# Patient Record
Sex: FEMALE | Race: WHITE | NOT HISPANIC OR LATINO
[De-identification: names, ages, dates, MRNs, and addresses within clinical notes are randomized per-mention and may not be internally consistent; named-entity substitution may affect disease eponyms.]

---

## 2017-01-23 ENCOUNTER — APPOINTMENT (OUTPATIENT)
Dept: HEART AND VASCULAR | Facility: CLINIC | Age: 67
End: 2017-01-23

## 2017-01-23 VITALS
SYSTOLIC BLOOD PRESSURE: 124 MMHG | HEIGHT: 65 IN | BODY MASS INDEX: 17.83 KG/M2 | HEART RATE: 61 BPM | RESPIRATION RATE: 14 BRPM | OXYGEN SATURATION: 98 % | WEIGHT: 107 LBS | TEMPERATURE: 97.5 F | DIASTOLIC BLOOD PRESSURE: 80 MMHG

## 2017-03-08 ENCOUNTER — APPOINTMENT (OUTPATIENT)
Dept: INTERNAL MEDICINE | Facility: CLINIC | Age: 67
End: 2017-03-08

## 2017-03-08 VITALS
HEART RATE: 64 BPM | SYSTOLIC BLOOD PRESSURE: 100 MMHG | OXYGEN SATURATION: 100 % | RESPIRATION RATE: 12 BRPM | TEMPERATURE: 98.2 F | DIASTOLIC BLOOD PRESSURE: 64 MMHG | WEIGHT: 104 LBS | BODY MASS INDEX: 17.31 KG/M2

## 2017-03-09 LAB
ALBUMIN SERPL ELPH-MCNC: 4.6 G/DL
ALP BLD-CCNC: 49 U/L
ALT SERPL-CCNC: 19 U/L
ANION GAP SERPL CALC-SCNC: 15 MMOL/L
AST SERPL-CCNC: 26 U/L
BILIRUB SERPL-MCNC: 0.4 MG/DL
BUN SERPL-MCNC: 21 MG/DL
CALCIUM SERPL-MCNC: 10.5 MG/DL
CHLORIDE SERPL-SCNC: 99 MMOL/L
CHOLEST SERPL-MCNC: 175 MG/DL
CHOLEST/HDLC SERPL: 1.7 RATIO
CO2 SERPL-SCNC: 27 MMOL/L
CREAT SERPL-MCNC: 0.86 MG/DL
GLUCOSE SERPL-MCNC: 107 MG/DL
HBA1C MFR BLD HPLC: 6.1 %
HDLC SERPL-MCNC: 104 MG/DL
LDLC SERPL CALC-MCNC: 61 MG/DL
POTASSIUM SERPL-SCNC: 6.2 MMOL/L
PROT SERPL-MCNC: 7.3 G/DL
SODIUM SERPL-SCNC: 141 MMOL/L
TRIGL SERPL-MCNC: 49 MG/DL

## 2017-03-10 ENCOUNTER — LABORATORY RESULT (OUTPATIENT)
Age: 67
End: 2017-03-10

## 2017-03-10 ENCOUNTER — APPOINTMENT (OUTPATIENT)
Dept: HEART AND VASCULAR | Facility: CLINIC | Age: 67
End: 2017-03-10

## 2017-03-10 VITALS
RESPIRATION RATE: 12 BRPM | SYSTOLIC BLOOD PRESSURE: 110 MMHG | DIASTOLIC BLOOD PRESSURE: 72 MMHG | BODY MASS INDEX: 17.49 KG/M2 | WEIGHT: 105 LBS | HEART RATE: 65 BPM | HEIGHT: 65 IN | TEMPERATURE: 97.3 F | OXYGEN SATURATION: 99 %

## 2017-03-15 LAB
ALDOSTERONE SERUM: 29.5 NG/DL
MAGNESIUM SERPL-MCNC: 2.3 MG/DL

## 2017-03-23 ENCOUNTER — APPOINTMENT (OUTPATIENT)
Dept: HEART AND VASCULAR | Facility: CLINIC | Age: 67
End: 2017-03-23

## 2017-03-23 VITALS
HEIGHT: 65 IN | HEART RATE: 56 BPM | OXYGEN SATURATION: 98 % | SYSTOLIC BLOOD PRESSURE: 110 MMHG | WEIGHT: 105 LBS | RESPIRATION RATE: 12 BRPM | DIASTOLIC BLOOD PRESSURE: 60 MMHG | BODY MASS INDEX: 17.49 KG/M2

## 2017-06-28 ENCOUNTER — RX RENEWAL (OUTPATIENT)
Age: 67
End: 2017-06-28

## 2017-06-28 ENCOUNTER — LABORATORY RESULT (OUTPATIENT)
Age: 67
End: 2017-06-28

## 2017-06-28 ENCOUNTER — APPOINTMENT (OUTPATIENT)
Dept: ENDOCRINOLOGY | Facility: CLINIC | Age: 67
End: 2017-06-28

## 2017-06-28 VITALS
BODY MASS INDEX: 17.14 KG/M2 | SYSTOLIC BLOOD PRESSURE: 117 MMHG | HEART RATE: 60 BPM | DIASTOLIC BLOOD PRESSURE: 73 MMHG | WEIGHT: 103 LBS

## 2017-07-06 LAB
ALDOST 24H UR-MCNC: NORMAL
ANION GAP SERPL CALC-SCNC: 21 MMOL/L
APPEARANCE: CLEAR
BILIRUBIN URINE: NEGATIVE
BLOOD URINE: ABNORMAL
BUN SERPL-MCNC: 20 MG/DL
CALCIUM SERPL-MCNC: 10.6 MG/DL
CHLORIDE SERPL-SCNC: 99 MMOL/L
CO2 SERPL-SCNC: 25 MMOL/L
COLLAGEN CTX SERPL-MCNC: 119 PG/ML
COLOR: YELLOW
CREAT SERPL-MCNC: 0.88 MG/DL
GLUCOSE QUALITATIVE U: NORMAL MG/DL
GLUCOSE SERPL-MCNC: 85 MG/DL
KETONES URINE: NEGATIVE
LEUKOCYTE ESTERASE URINE: NEGATIVE
NITRITE URINE: NEGATIVE
PH URINE: 6.5
POTASSIUM SERPL-SCNC: 5.4 MMOL/L
PROTEIN URINE: NEGATIVE MG/DL
RENIN PLASMA: 8.4 PG/ML
SODIUM SERPL-SCNC: 145 MMOL/L
SPECIFIC GRAVITY URINE: 1.01
UROBILINOGEN URINE: NORMAL MG/DL

## 2017-09-08 ENCOUNTER — APPOINTMENT (OUTPATIENT)
Dept: INTERNAL MEDICINE | Facility: CLINIC | Age: 67
End: 2017-09-08
Payer: MEDICARE

## 2017-09-08 ENCOUNTER — LABORATORY RESULT (OUTPATIENT)
Age: 67
End: 2017-09-08

## 2017-09-08 VITALS
BODY MASS INDEX: 17.47 KG/M2 | TEMPERATURE: 98.6 F | HEART RATE: 61 BPM | DIASTOLIC BLOOD PRESSURE: 63 MMHG | OXYGEN SATURATION: 96 % | RESPIRATION RATE: 12 BRPM | WEIGHT: 105 LBS | SYSTOLIC BLOOD PRESSURE: 110 MMHG

## 2017-09-08 PROCEDURE — 99213 OFFICE O/P EST LOW 20 MIN: CPT | Mod: 25

## 2017-09-08 PROCEDURE — 36415 COLL VENOUS BLD VENIPUNCTURE: CPT

## 2017-09-11 ENCOUNTER — MESSAGE (OUTPATIENT)
Age: 67
End: 2017-09-11

## 2017-09-11 LAB
ANION GAP SERPL CALC-SCNC: 15 MMOL/L
APPEARANCE: CLEAR
BILIRUBIN URINE: NEGATIVE
BLOOD URINE: ABNORMAL
BUN SERPL-MCNC: 24 MG/DL
CALCIUM SERPL-MCNC: 10.2 MG/DL
CHLORIDE SERPL-SCNC: 102 MMOL/L
CO2 SERPL-SCNC: 28 MMOL/L
COLOR: YELLOW
CREAT SERPL-MCNC: 0.84 MG/DL
GLUCOSE QUALITATIVE U: NORMAL MG/DL
GLUCOSE SERPL-MCNC: 92 MG/DL
KETONES URINE: NEGATIVE
LEUKOCYTE ESTERASE URINE: ABNORMAL
NITRITE URINE: POSITIVE
PH URINE: 5.5
POTASSIUM SERPL-SCNC: 5.1 MMOL/L
POTASSIUM UR-SCNC: 77 MMOL/L
PROTEIN URINE: NEGATIVE MG/DL
SODIUM ?TM SUB UR QN: 35 MMOL/L
SODIUM SERPL-SCNC: 145 MMOL/L
SPECIFIC GRAVITY URINE: 1.02
UROBILINOGEN URINE: NORMAL MG/DL

## 2017-10-04 ENCOUNTER — RX RENEWAL (OUTPATIENT)
Age: 67
End: 2017-10-04

## 2017-12-05 ENCOUNTER — RX RENEWAL (OUTPATIENT)
Age: 67
End: 2017-12-05

## 2018-02-05 ENCOUNTER — APPOINTMENT (OUTPATIENT)
Dept: ENDOCRINOLOGY | Facility: CLINIC | Age: 68
End: 2018-02-05
Payer: MEDICARE

## 2018-02-05 VITALS
HEART RATE: 59 BPM | DIASTOLIC BLOOD PRESSURE: 67 MMHG | SYSTOLIC BLOOD PRESSURE: 106 MMHG | WEIGHT: 104 LBS | BODY MASS INDEX: 17.31 KG/M2

## 2018-02-05 PROCEDURE — 99213 OFFICE O/P EST LOW 20 MIN: CPT

## 2018-02-09 ENCOUNTER — MEDICATION RENEWAL (OUTPATIENT)
Age: 68
End: 2018-02-09

## 2018-03-21 LAB
APPEARANCE: CLEAR
BACTERIA: NEGATIVE
BILIRUBIN URINE: NEGATIVE
BLOOD URINE: NEGATIVE
COLOR: YELLOW
GLUCOSE QUALITATIVE U: NEGATIVE MG/DL
HYALINE CASTS: 0 /LPF
KETONES URINE: NEGATIVE
LEUKOCYTE ESTERASE URINE: NEGATIVE
MICROSCOPIC-UA: NORMAL
NITRITE URINE: NEGATIVE
PH URINE: 5.5
PROTEIN URINE: NEGATIVE MG/DL
RED BLOOD CELLS URINE: 2 /HPF
SPECIFIC GRAVITY URINE: 1.02
SQUAMOUS EPITHELIAL CELLS: 12 /HPF
UROBILINOGEN URINE: NEGATIVE MG/DL
WHITE BLOOD CELLS URINE: 0 /HPF

## 2018-03-23 LAB — BACTERIA UR CULT: ABNORMAL

## 2018-04-10 ENCOUNTER — APPOINTMENT (OUTPATIENT)
Dept: HEART AND VASCULAR | Facility: CLINIC | Age: 68
End: 2018-04-10
Payer: MEDICARE

## 2018-04-10 VITALS
SYSTOLIC BLOOD PRESSURE: 126 MMHG | OXYGEN SATURATION: 94 % | RESPIRATION RATE: 12 BRPM | HEART RATE: 40 BPM | WEIGHT: 106 LBS | HEIGHT: 65 IN | BODY MASS INDEX: 17.66 KG/M2 | TEMPERATURE: 96.6 F | DIASTOLIC BLOOD PRESSURE: 70 MMHG

## 2018-04-10 VITALS — SYSTOLIC BLOOD PRESSURE: 120 MMHG | DIASTOLIC BLOOD PRESSURE: 60 MMHG

## 2018-04-10 PROCEDURE — 93000 ELECTROCARDIOGRAM COMPLETE: CPT

## 2018-04-10 PROCEDURE — 36415 COLL VENOUS BLD VENIPUNCTURE: CPT

## 2018-04-10 PROCEDURE — G0439: CPT

## 2018-04-11 LAB
25(OH)D3 SERPL-MCNC: 53.5 NG/ML
ALBUMIN SERPL ELPH-MCNC: 5.1 G/DL
ALP BLD-CCNC: 53 U/L
ALT SERPL-CCNC: 21 U/L
ANION GAP SERPL CALC-SCNC: 23 MMOL/L
AST SERPL-CCNC: 28 U/L
B BURGDOR AB SER-IMP: NEGATIVE
B BURGDOR IGM PATRN SER IB-IMP: NEGATIVE
B BURGDOR18/20KD IGM SER QL IB: NORMAL
B BURGDOR18KD IGG SER QL IB: PRESENT
B BURGDOR23KD IGG SER QL IB: NORMAL
B BURGDOR23KD IGM SER QL IB: NORMAL
B BURGDOR28KD AB SER QL IB: NORMAL
B BURGDOR28KD IGG SER QL IB: NORMAL
B BURGDOR30KD AB SER QL IB: NORMAL
B BURGDOR30KD IGG SER QL IB: NORMAL
B BURGDOR31KD IGG SER QL IB: NORMAL
B BURGDOR31KD IGM SER QL IB: NORMAL
B BURGDOR39KD IGG SER QL IB: NORMAL
B BURGDOR39KD IGM SER QL IB: NORMAL
B BURGDOR41KD IGG SER QL IB: NORMAL
B BURGDOR41KD IGM SER QL IB: NORMAL
B BURGDOR45KD AB SER QL IB: NORMAL
B BURGDOR45KD IGG SER QL IB: NORMAL
B BURGDOR58KD AB SER QL IB: NORMAL
B BURGDOR58KD IGG SER QL IB: NORMAL
B BURGDOR66KD IGG SER QL IB: PRESENT
B BURGDOR66KD IGM SER QL IB: NORMAL
B BURGDOR93KD IGG SER QL IB: NORMAL
B BURGDOR93KD IGM SER QL IB: NORMAL
BASOPHILS # BLD AUTO: 0.02 K/UL
BASOPHILS NFR BLD AUTO: 0.4 %
BILIRUB SERPL-MCNC: 0.5 MG/DL
BUN SERPL-MCNC: 19 MG/DL
CALCIUM SERPL-MCNC: 10.4 MG/DL
CHLORIDE SERPL-SCNC: 101 MMOL/L
CHOLEST SERPL-MCNC: 202 MG/DL
CHOLEST/HDLC SERPL: 1.8 RATIO
CO2 SERPL-SCNC: 23 MMOL/L
CREAT SERPL-MCNC: 0.9 MG/DL
EOSINOPHIL # BLD AUTO: 0.22 K/UL
EOSINOPHIL NFR BLD AUTO: 4.6 %
FOLATE SERPL-MCNC: >20 NG/ML
GLUCOSE SERPL-MCNC: 87 MG/DL
HBA1C MFR BLD HPLC: 5.8 %
HCT VFR BLD CALC: 45.6 %
HCV AB SER QL: NONREACTIVE
HCV S/CO RATIO: 0.28 S/CO
HDLC SERPL-MCNC: 115 MG/DL
HGB BLD-MCNC: 14 G/DL
IMM GRANULOCYTES NFR BLD AUTO: 0.2 %
LDLC SERPL CALC-MCNC: 74 MG/DL
LYMPHOCYTES # BLD AUTO: 1.14 K/UL
LYMPHOCYTES NFR BLD AUTO: 24 %
MAN DIFF?: NORMAL
MCHC RBC-ENTMCNC: 30.7 GM/DL
MCHC RBC-ENTMCNC: 31.5 PG
MCV RBC AUTO: 102.7 FL
MONOCYTES # BLD AUTO: 0.63 K/UL
MONOCYTES NFR BLD AUTO: 13.3 %
NEUTROPHILS # BLD AUTO: 2.73 K/UL
NEUTROPHILS NFR BLD AUTO: 57.5 %
PLATELET # BLD AUTO: 220 K/UL
POTASSIUM SERPL-SCNC: 5.4 MMOL/L
PROT SERPL-MCNC: 7.8 G/DL
RBC # BLD: 4.44 M/UL
RBC # FLD: 14.3 %
SODIUM SERPL-SCNC: 147 MMOL/L
TRIGL SERPL-MCNC: 66 MG/DL
TSH SERPL-ACNC: 2.67 UIU/ML
VIT B12 SERPL-MCNC: 1251 PG/ML
WBC # FLD AUTO: 4.75 K/UL

## 2018-06-01 LAB
ALBUMIN SERPL ELPH-MCNC: 4.6 G/DL
ALP BLD-CCNC: 51 U/L
ALT SERPL-CCNC: 22 U/L
ANION GAP SERPL CALC-SCNC: 12 MMOL/L
AST SERPL-CCNC: 26 U/L
BILIRUB SERPL-MCNC: 0.4 MG/DL
BUN SERPL-MCNC: 21 MG/DL
CALCIUM SERPL-MCNC: 9.4 MG/DL
CHLORIDE SERPL-SCNC: 102 MMOL/L
CHOLEST SERPL-MCNC: 178 MG/DL
CHOLEST/HDLC SERPL: 1.8 RATIO
CO2 SERPL-SCNC: 29 MMOL/L
CREAT SERPL-MCNC: 0.88 MG/DL
GLUCOSE SERPL-MCNC: 94 MG/DL
HDLC SERPL-MCNC: 99 MG/DL
LDLC SERPL CALC-MCNC: 67 MG/DL
POTASSIUM SERPL-SCNC: 6.1 MMOL/L
PROT SERPL-MCNC: 7.2 G/DL
SODIUM SERPL-SCNC: 143 MMOL/L
TRIGL SERPL-MCNC: 59 MG/DL

## 2018-06-13 ENCOUNTER — APPOINTMENT (OUTPATIENT)
Dept: HEART AND VASCULAR | Facility: CLINIC | Age: 68
End: 2018-06-13
Payer: MEDICARE

## 2018-06-13 DIAGNOSIS — I65.23 OCCLUSION AND STENOSIS OF BILATERAL CAROTID ARTERIES: ICD-10-CM

## 2018-06-13 PROCEDURE — 93306 TTE W/DOPPLER COMPLETE: CPT

## 2018-06-13 PROCEDURE — 99213 OFFICE O/P EST LOW 20 MIN: CPT | Mod: 25

## 2018-06-13 PROCEDURE — 93880 EXTRACRANIAL BILAT STUDY: CPT | Mod: XE

## 2018-06-18 ENCOUNTER — APPOINTMENT (OUTPATIENT)
Dept: INTERNAL MEDICINE | Facility: CLINIC | Age: 68
End: 2018-06-18
Payer: MEDICARE

## 2018-06-18 VITALS
TEMPERATURE: 97.7 F | SYSTOLIC BLOOD PRESSURE: 122 MMHG | BODY MASS INDEX: 17.33 KG/M2 | WEIGHT: 104 LBS | RESPIRATION RATE: 14 BRPM | HEART RATE: 74 BPM | HEIGHT: 65 IN | OXYGEN SATURATION: 96 % | DIASTOLIC BLOOD PRESSURE: 74 MMHG

## 2018-06-18 PROCEDURE — 36415 COLL VENOUS BLD VENIPUNCTURE: CPT

## 2018-06-18 PROCEDURE — 99213 OFFICE O/P EST LOW 20 MIN: CPT | Mod: 25

## 2018-06-19 LAB
ANION GAP SERPL CALC-SCNC: 15 MMOL/L
BUN SERPL-MCNC: 20 MG/DL
CALCIUM SERPL-MCNC: 10.3 MG/DL
CHLORIDE SERPL-SCNC: 101 MMOL/L
CO2 SERPL-SCNC: 28 MMOL/L
CREAT SERPL-MCNC: 0.85 MG/DL
GLUCOSE SERPL-MCNC: 103 MG/DL
POTASSIUM SERPL-SCNC: 5.4 MMOL/L
SODIUM SERPL-SCNC: 144 MMOL/L

## 2018-07-12 ENCOUNTER — RX RENEWAL (OUTPATIENT)
Age: 68
End: 2018-07-12

## 2018-07-16 ENCOUNTER — MESSAGE (OUTPATIENT)
Age: 68
End: 2018-07-16

## 2018-07-16 LAB
CREAT 24H UR-MCNC: 0.7 G/24 H
CREAT 24H UR-MCNC: 0.7 G/24 H
CREAT ?TM UR-MCNC: 25 MG/DL
CREAT ?TM UR-MCNC: 25 MG/DL
POTASSIUM 24H UR-MRATE: 75 MMOL/24HR
POTASSIUM ?TM SUB UR QN: 26 MMOL/L
PROT ?TM UR-MCNC: 24 HR
PROT ?TM UR-MCNC: 24 HR
SPECIMEN VOL 24H UR: 2900 ML
SPECIMEN VOL 24H UR: 2900 ML

## 2018-07-25 ENCOUNTER — MESSAGE (OUTPATIENT)
Age: 68
End: 2018-07-25

## 2018-07-25 LAB
ALDOSTERONE SERUM: 11.3 NG/DL
ANION GAP SERPL CALC-SCNC: 13 MMOL/L
BUN SERPL-MCNC: 20 MG/DL
CALCIUM SERPL-MCNC: 9.8 MG/DL
CHLORIDE SERPL-SCNC: 100 MMOL/L
CO2 SERPL-SCNC: 29 MMOL/L
CREAT SERPL-MCNC: 0.9 MG/DL
GLUCOSE SERPL-MCNC: 85 MG/DL
POTASSIUM SERPL-SCNC: 5.3 MMOL/L
RENIN PLASMA: 13.8 PG/ML
SODIUM SERPL-SCNC: 142 MMOL/L

## 2018-08-08 ENCOUNTER — INPATIENT (INPATIENT)
Facility: HOSPITAL | Age: 68
LOS: 1 days | Discharge: HOME | DRG: 157 | End: 2018-08-10
Attending: NEUROLOGICAL SURGERY | Admitting: NEUROLOGICAL SURGERY
Payer: MEDICARE

## 2018-08-08 VITALS
HEART RATE: 78 BPM | TEMPERATURE: 98 F | OXYGEN SATURATION: 97 % | DIASTOLIC BLOOD PRESSURE: 75 MMHG | SYSTOLIC BLOOD PRESSURE: 136 MMHG | WEIGHT: 103.62 LBS | RESPIRATION RATE: 18 BRPM

## 2018-08-08 LAB
ANION GAP SERPL CALC-SCNC: 13 MMOL/L — SIGNIFICANT CHANGE UP (ref 5–17)
APTT BLD: 32.5 SEC — SIGNIFICANT CHANGE UP (ref 27.5–37.4)
BASOPHILS NFR BLD AUTO: 0.1 % — SIGNIFICANT CHANGE UP (ref 0–2)
BLD GP AB SCN SERPL QL: NEGATIVE — SIGNIFICANT CHANGE UP
BUN SERPL-MCNC: 21 MG/DL — SIGNIFICANT CHANGE UP (ref 7–23)
CALCIUM SERPL-MCNC: 9.9 MG/DL — SIGNIFICANT CHANGE UP (ref 8.4–10.5)
CHLORIDE SERPL-SCNC: 99 MMOL/L — SIGNIFICANT CHANGE UP (ref 96–108)
CO2 SERPL-SCNC: 30 MMOL/L — SIGNIFICANT CHANGE UP (ref 22–31)
CREAT SERPL-MCNC: 0.74 MG/DL — SIGNIFICANT CHANGE UP (ref 0.5–1.3)
EOSINOPHIL NFR BLD AUTO: 0.3 % — SIGNIFICANT CHANGE UP (ref 0–6)
GLUCOSE SERPL-MCNC: 110 MG/DL — HIGH (ref 70–99)
HCT VFR BLD CALC: 40.4 % — SIGNIFICANT CHANGE UP (ref 34.5–45)
HGB BLD-MCNC: 13.1 G/DL — SIGNIFICANT CHANGE UP (ref 11.5–15.5)
INR BLD: 1.03 — SIGNIFICANT CHANGE UP (ref 0.88–1.16)
LYMPHOCYTES # BLD AUTO: 8.3 % — LOW (ref 13–44)
MCHC RBC-ENTMCNC: 30.8 PG — SIGNIFICANT CHANGE UP (ref 27–34)
MCHC RBC-ENTMCNC: 32.4 G/DL — SIGNIFICANT CHANGE UP (ref 32–36)
MCV RBC AUTO: 94.8 FL — SIGNIFICANT CHANGE UP (ref 80–100)
MONOCYTES NFR BLD AUTO: 7.4 % — SIGNIFICANT CHANGE UP (ref 2–14)
NEUTROPHILS NFR BLD AUTO: 83.9 % — HIGH (ref 43–77)
PLATELET # BLD AUTO: 198 K/UL — SIGNIFICANT CHANGE UP (ref 150–400)
POTASSIUM SERPL-MCNC: 4 MMOL/L — SIGNIFICANT CHANGE UP (ref 3.5–5.3)
POTASSIUM SERPL-SCNC: 4 MMOL/L — SIGNIFICANT CHANGE UP (ref 3.5–5.3)
PROTHROM AB SERPL-ACNC: 11.4 SEC — SIGNIFICANT CHANGE UP (ref 9.8–12.7)
RBC # BLD: 4.26 M/UL — SIGNIFICANT CHANGE UP (ref 3.8–5.2)
RBC # FLD: 13.5 % — SIGNIFICANT CHANGE UP (ref 10.3–16.9)
RH IG SCN BLD-IMP: POSITIVE — SIGNIFICANT CHANGE UP
SODIUM SERPL-SCNC: 142 MMOL/L — SIGNIFICANT CHANGE UP (ref 135–145)
WBC # BLD: 9.6 K/UL — SIGNIFICANT CHANGE UP (ref 3.8–10.5)
WBC # FLD AUTO: 9.6 K/UL — SIGNIFICANT CHANGE UP (ref 3.8–10.5)

## 2018-08-08 PROCEDURE — 99285 EMERGENCY DEPT VISIT HI MDM: CPT

## 2018-08-08 PROCEDURE — 70450 CT HEAD/BRAIN W/O DYE: CPT | Mod: 26

## 2018-08-08 PROCEDURE — 70486 CT MAXILLOFACIAL W/O DYE: CPT | Mod: 26

## 2018-08-08 RX ORDER — LEVETIRACETAM 250 MG/1
500 TABLET, FILM COATED ORAL
Qty: 0 | Refills: 0 | Status: DISCONTINUED | OUTPATIENT
Start: 2018-08-09 | End: 2018-08-10

## 2018-08-08 RX ORDER — PENICILLIN V POTASSIUM 250 MG
500 TABLET ORAL ONCE
Qty: 0 | Refills: 0 | Status: COMPLETED | OUTPATIENT
Start: 2018-08-08 | End: 2018-08-08

## 2018-08-08 RX ORDER — LEVETIRACETAM 250 MG/1
500 TABLET, FILM COATED ORAL ONCE
Qty: 0 | Refills: 0 | Status: COMPLETED | OUTPATIENT
Start: 2018-08-08 | End: 2018-08-08

## 2018-08-08 RX ORDER — SIMVASTATIN 20 MG/1
40 TABLET, FILM COATED ORAL AT BEDTIME
Qty: 0 | Refills: 0 | Status: DISCONTINUED | OUTPATIENT
Start: 2018-08-08 | End: 2018-08-10

## 2018-08-08 RX ORDER — ACETAMINOPHEN 500 MG
650 TABLET ORAL EVERY 6 HOURS
Qty: 0 | Refills: 0 | Status: DISCONTINUED | OUTPATIENT
Start: 2018-08-08 | End: 2018-08-10

## 2018-08-08 RX ORDER — DESMOPRESSIN ACETATE 0.1 MG/1
14 TABLET ORAL ONCE
Qty: 0 | Refills: 0 | Status: COMPLETED | OUTPATIENT
Start: 2018-08-08 | End: 2018-08-08

## 2018-08-08 RX ORDER — TETANUS TOXOID, REDUCED DIPHTHERIA TOXOID AND ACELLULAR PERTUSSIS VACCINE, ADSORBED 5; 2.5; 8; 8; 2.5 [IU]/.5ML; [IU]/.5ML; UG/.5ML; UG/.5ML; UG/.5ML
0.5 SUSPENSION INTRAMUSCULAR ONCE
Qty: 0 | Refills: 0 | Status: COMPLETED | OUTPATIENT
Start: 2018-08-08 | End: 2018-08-08

## 2018-08-08 RX ORDER — CEPHALEXIN 500 MG
500 CAPSULE ORAL EVERY 12 HOURS
Qty: 0 | Refills: 0 | Status: DISCONTINUED | OUTPATIENT
Start: 2018-08-08 | End: 2018-08-10

## 2018-08-08 RX ORDER — CEPHALEXIN 500 MG
500 CAPSULE ORAL
Qty: 0 | Refills: 0 | Status: DISCONTINUED | OUTPATIENT
Start: 2018-08-08 | End: 2018-08-08

## 2018-08-08 RX ORDER — CEFAZOLIN SODIUM 1 G
1000 VIAL (EA) INJECTION ONCE
Qty: 0 | Refills: 0 | Status: COMPLETED | OUTPATIENT
Start: 2018-08-08 | End: 2018-08-08

## 2018-08-08 RX ORDER — DESMOPRESSIN ACETATE 0.1 MG/1
14 TABLET ORAL ONCE
Qty: 0 | Refills: 0 | Status: DISCONTINUED | OUTPATIENT
Start: 2018-08-08 | End: 2018-08-08

## 2018-08-08 RX ORDER — SENNA PLUS 8.6 MG/1
2 TABLET ORAL AT BEDTIME
Qty: 0 | Refills: 0 | Status: DISCONTINUED | OUTPATIENT
Start: 2018-08-08 | End: 2018-08-10

## 2018-08-08 RX ORDER — DOCUSATE SODIUM 100 MG
100 CAPSULE ORAL THREE TIMES A DAY
Qty: 0 | Refills: 0 | Status: DISCONTINUED | OUTPATIENT
Start: 2018-08-08 | End: 2018-08-10

## 2018-08-08 RX ORDER — ACETAMINOPHEN 500 MG
975 TABLET ORAL ONCE
Qty: 0 | Refills: 0 | Status: COMPLETED | OUTPATIENT
Start: 2018-08-08 | End: 2018-08-08

## 2018-08-08 RX ADMIN — Medication 975 MILLIGRAM(S): at 15:56

## 2018-08-08 RX ADMIN — LEVETIRACETAM 500 MILLIGRAM(S): 250 TABLET, FILM COATED ORAL at 17:57

## 2018-08-08 RX ADMIN — Medication 100 MILLIGRAM(S): at 18:07

## 2018-08-08 RX ADMIN — Medication 500 MILLIGRAM(S): at 14:47

## 2018-08-08 RX ADMIN — TETANUS TOXOID, REDUCED DIPHTHERIA TOXOID AND ACELLULAR PERTUSSIS VACCINE, ADSORBED 0.5 MILLILITER(S): 5; 2.5; 8; 8; 2.5 SUSPENSION INTRAMUSCULAR at 14:47

## 2018-08-08 RX ADMIN — DESMOPRESSIN ACETATE 214 MICROGRAM(S): 0.1 TABLET ORAL at 19:10

## 2018-08-08 RX ADMIN — Medication 100 MILLIGRAM(S): at 21:43

## 2018-08-08 NOTE — ED ADULT NURSE NOTE - NSIMPLEMENTINTERV_GEN_ALL_ED
Implemented All Fall Risk Interventions:  [place] to call system. Call bell, personal items and telephone within reach. Instruct patient to call for assistance. Room bathroom lighting operational. Non-slip footwear when patient is off stretcher. Physically safe environment: no spills, clutter or unnecessary equipment. Stretcher in lowest position, wheels locked, appropriate side rails in place. Provide visual cue, wrist band, yellow gown, etc. Monitor gait and stability. Monitor for mental status changes and reorient to person, place, and time. Review medications for side effects contributing to fall risk. Reinforce activity limits and safety measures with patient and family.

## 2018-08-08 NOTE — H&P ADULT - NSHPLABSRESULTS_GEN_ALL_CORE
CTH/maxillofacial 8/8/18:    IMPRESSION:    Multifocal comminuted fracture of the right maxillary sinus, nondisplaced   fractures of the right orbital floor and roof with mild inferior   extraconal hemorrhage and gas but no proptosis or retrobulbar hemorrhage.   Orbital floor fracture traverses the infraorbital foramen; correlate for   midface numbness. No imaging findings of extraocular muscle entrapment,   though clinical correlation is suggested.    Intracranially, there is acute interhemispheric subdural hematoma.

## 2018-08-08 NOTE — H&P ADULT - HISTORY OF PRESENT ILLNESS
66y/o F with PMHx sig for HLD, on baby aspirin, BIBEMS to ER after sustaining a mechanical fall along the pavement of a sidewalk. Pt reports she was walking and suddenly tripped landing on her face (mainly right side), with associated LOC, and dizziness after. CTH reveals an acute interhemispheric subdural hematoma. CT maxillofacial reveals "multifocal comminuted fracture of the right maxillary sinus, nondisplaced fractures of the right orbital floor and roof with mild inferior extraconal hemorrhage and gas." Pt also has a right eyebrow laceration, which was sutured by plastics. She reports "droplets of blood" from her nose. Pt currently denies headache, N/V, neck pain, acute changes in vision, acute weakness/loss of sensation of extremities, fever, chills, CP, SOB, urinary/bowel incontinence.

## 2018-08-08 NOTE — H&P ADULT - NSHPPHYSICALEXAM_GEN_ALL_CORE
PHYSICAL EXAM:  Constitutional: NAD, resting comfortably  Eyes: R periorbital edema with surrounding ecchymosis  ENMT: No active rhinorrhea or discharge from nose, chipped teeth  Neck: Supple, nontender  Back: Nontender to palpation  Respiratory: CTA B/L  Cardiovascular: RRR, +S1/S2  Gastrointestinal: Abdomen soft, NT/ND  Neurological: AAox3, FC, speech coherent  CNII-XII: EOM intact, PERRL, face symmetric, tongue midline  Motor: MAEx4 5/5 UE and LE B/L, negative protonator drift, negative dysmetria  SILT throughout

## 2018-08-08 NOTE — ED PROVIDER NOTE - OBJECTIVE STATEMENT
66 yo female h/o eczema, hyperkalemia, carotid arthersclerosis, mitral regurg, osteoporosis, hsv, polio c/o mechanical fall on uneven pavement w chi, loc, mild frontal ha. Pt w lac R eyebrow, inner R lip and chipped tooth on dental bridge as well as bilat knee abrasion.  Pt w mild confusion after fall, now at baseline.  No neck/back/chest/abd pain, n/v, pain in ext.  Ambulatory after fall.  Unsure of last td.  Requests plastics repair.

## 2018-08-08 NOTE — ED PROVIDER NOTE - PROGRESS NOTE DETAILS
Plastics consulted and will eval. Verbal report of ct head/face - + maxillary sinus fx, orbital floor and roof fx, small sdh.  NSG consulted and will eval. Lacs repaired by Dr Houston who will see pt in fu in clinic 8/14, keflex x 7d.  Per nsg, pt tba to stepdown under Dr Rodriguez and request keppra 500 mg po.

## 2018-08-08 NOTE — H&P ADULT - ATTENDING COMMENTS
Patient seen and examined by me. She had a mechanical fall resulting in a thin anterior interhemispheric subdural hematoma. There is no mass effect. Patient is neurologically normal. Plan for repeat CT scan, keppra x 1 week, OMFS consultation for facial/oral/dental fractures, syncope workup per medicine. No neurosurgical intervention needed. Expect home dispo once medical/PT cleared.    Frank Rodriguez M.D.  Neurosurgery

## 2018-08-08 NOTE — H&P ADULT - PMH
Carotid atherosclerosis, bilateral    Eczema    HSV (herpes simplex virus) infection    Hyperlipidemia    Mitral regurgitation    Osteoporosis    Polio

## 2018-08-08 NOTE — ED ADULT TRIAGE NOTE - OTHER COMPLAINTS
patient does not remember how she feel, does not remember if she lost consciousness. Patient denies blood thinner use.

## 2018-08-08 NOTE — ED PROVIDER NOTE - MEDICAL DECISION MAKING DETAILS
Pt w mechanical fall w chi, eyebrow and inner lip lac, dental injury.  Plan ct head/face, update td, plastics for wound repair, abx for dental fx (may be pt's bridge rather than tooth - to fu w dental as outpt); pt declined pain meds.

## 2018-08-08 NOTE — ED PROVIDER NOTE - CARE PLAN
Principal Discharge DX:	SDH (subdural hematoma)  Secondary Diagnosis:	Facial fracture due to fall  Secondary Diagnosis:	Facial laceration, initial encounter

## 2018-08-08 NOTE — ED ADULT NURSE NOTE - EENT WDL
Eyes with no visual disturbances.  Ears clean and dry and no hearing difficulties. Nose with pink mucosa and no drainage, scant amount of dried blood noted to right nare.  Mouth mucous membranes moist and pink.  No tenderness or swelling to throat or neck.

## 2018-08-08 NOTE — ED PROVIDER NOTE - ENMT, MLM
Airway patent, Nasal mucosa clear. Mouth ecchymosis and mild swelling R upper lip, small stellate lac inner lip, chipped R incisor, periorbital ecchymosis and abrasion, 1 cm R eyebrow lac, throat has no vesicles, no oropharyngeal exudates and uvula is midline. tm bilat cerumen impaction, R upper incisor w small chip, mild ttp maxilla over R teeth, no other facial bone ttp

## 2018-08-08 NOTE — H&P ADULT - ASSESSMENT
66y/o F with multiple comminuted facial fractures and an acute interhemispheric SDH    PLAN:  -Admit to stepdown  -Continue keppra 500mg BID x 1 week  -Aspirin reversal with DDAVP  -Obtain repeat CTH at 11pm  -Per plastic surgery, continue keflex 500mg QID PO x1 week and f/u with Dr. Ender Houston on Tuesday 8/14/18  -ENT consulted, however nothing to do as plastic surgery is involved  -DVT prophylaxis: SCDs  -D/w Dr. Rodriguez

## 2018-08-08 NOTE — ED PROVIDER NOTE - MUSCULOSKELETAL, MLM
Spine appears normal, neck/back nontender, abrasion w/o ttp/swelling/deformity bilat knee, range of motion is not limited, no muscle or joint tenderness

## 2018-08-08 NOTE — ED ADULT NURSE NOTE - PMH
Carotid atherosclerosis, bilateral    Eczema    HSV (herpes simplex virus) infection    Mitral regurgitation    Osteoporosis    Polio

## 2018-08-09 ENCOUNTER — TRANSCRIPTION ENCOUNTER (OUTPATIENT)
Age: 68
End: 2018-08-09

## 2018-08-09 LAB
ANION GAP SERPL CALC-SCNC: 9 MMOL/L — SIGNIFICANT CHANGE UP (ref 5–17)
APTT BLD: 28.6 SEC — SIGNIFICANT CHANGE UP (ref 27.5–37.4)
BLD GP AB SCN SERPL QL: NEGATIVE — SIGNIFICANT CHANGE UP
BUN SERPL-MCNC: 16 MG/DL — SIGNIFICANT CHANGE UP (ref 7–23)
CALCIUM SERPL-MCNC: 9.3 MG/DL — SIGNIFICANT CHANGE UP (ref 8.4–10.5)
CHLORIDE SERPL-SCNC: 104 MMOL/L — SIGNIFICANT CHANGE UP (ref 96–108)
CO2 SERPL-SCNC: 29 MMOL/L — SIGNIFICANT CHANGE UP (ref 22–31)
CREAT SERPL-MCNC: 0.67 MG/DL — SIGNIFICANT CHANGE UP (ref 0.5–1.3)
GLUCOSE SERPL-MCNC: 82 MG/DL — SIGNIFICANT CHANGE UP (ref 70–99)
HCT VFR BLD CALC: 36.3 % — SIGNIFICANT CHANGE UP (ref 34.5–45)
HGB BLD-MCNC: 11.9 G/DL — SIGNIFICANT CHANGE UP (ref 11.5–15.5)
INR BLD: 1.02 — SIGNIFICANT CHANGE UP (ref 0.88–1.16)
MAGNESIUM SERPL-MCNC: 2.2 MG/DL — SIGNIFICANT CHANGE UP (ref 1.6–2.6)
MCHC RBC-ENTMCNC: 31.5 PG — SIGNIFICANT CHANGE UP (ref 27–34)
MCHC RBC-ENTMCNC: 32.8 G/DL — SIGNIFICANT CHANGE UP (ref 32–36)
MCV RBC AUTO: 96 FL — SIGNIFICANT CHANGE UP (ref 80–100)
PHOSPHATE SERPL-MCNC: 3.7 MG/DL — SIGNIFICANT CHANGE UP (ref 2.5–4.5)
PLATELET # BLD AUTO: 187 K/UL — SIGNIFICANT CHANGE UP (ref 150–400)
POTASSIUM SERPL-MCNC: 3.9 MMOL/L — SIGNIFICANT CHANGE UP (ref 3.5–5.3)
POTASSIUM SERPL-SCNC: 3.9 MMOL/L — SIGNIFICANT CHANGE UP (ref 3.5–5.3)
PROTHROM AB SERPL-ACNC: 11.3 SEC — SIGNIFICANT CHANGE UP (ref 9.8–12.7)
RBC # BLD: 3.78 M/UL — LOW (ref 3.8–5.2)
RBC # FLD: 13.7 % — SIGNIFICANT CHANGE UP (ref 10.3–16.9)
RH IG SCN BLD-IMP: POSITIVE — SIGNIFICANT CHANGE UP
SODIUM SERPL-SCNC: 142 MMOL/L — SIGNIFICANT CHANGE UP (ref 135–145)
WBC # BLD: 5.9 K/UL — SIGNIFICANT CHANGE UP (ref 3.8–10.5)
WBC # FLD AUTO: 5.9 K/UL — SIGNIFICANT CHANGE UP (ref 3.8–10.5)

## 2018-08-09 PROCEDURE — 93306 TTE W/DOPPLER COMPLETE: CPT | Mod: 26

## 2018-08-09 PROCEDURE — 99231 SBSQ HOSP IP/OBS SF/LOW 25: CPT | Mod: 24

## 2018-08-09 PROCEDURE — 93880 EXTRACRANIAL BILAT STUDY: CPT | Mod: 26

## 2018-08-09 PROCEDURE — 99223 1ST HOSP IP/OBS HIGH 75: CPT

## 2018-08-09 PROCEDURE — 70450 CT HEAD/BRAIN W/O DYE: CPT | Mod: 26

## 2018-08-09 PROCEDURE — 93010 ELECTROCARDIOGRAM REPORT: CPT

## 2018-08-09 RX ORDER — POTASSIUM CHLORIDE 20 MEQ
20 PACKET (EA) ORAL ONCE
Qty: 0 | Refills: 0 | Status: COMPLETED | OUTPATIENT
Start: 2018-08-09 | End: 2018-08-09

## 2018-08-09 RX ADMIN — LEVETIRACETAM 500 MILLIGRAM(S): 250 TABLET, FILM COATED ORAL at 17:33

## 2018-08-09 RX ADMIN — Medication 100 MILLIGRAM(S): at 22:08

## 2018-08-09 RX ADMIN — Medication 100 MILLIGRAM(S): at 05:12

## 2018-08-09 RX ADMIN — Medication 500 MILLIGRAM(S): at 06:43

## 2018-08-09 RX ADMIN — Medication 20 MILLIEQUIVALENT(S): at 11:55

## 2018-08-09 RX ADMIN — Medication 500 MILLIGRAM(S): at 17:33

## 2018-08-09 RX ADMIN — LEVETIRACETAM 500 MILLIGRAM(S): 250 TABLET, FILM COATED ORAL at 05:12

## 2018-08-09 RX ADMIN — SIMVASTATIN 40 MILLIGRAM(S): 20 TABLET, FILM COATED ORAL at 10:01

## 2018-08-09 RX ADMIN — Medication 100 MILLIGRAM(S): at 13:48

## 2018-08-09 NOTE — DISCHARGE NOTE ADULT - CARE PLAN
Principal Discharge DX:	SDH (subdural hematoma)  Goal:	Return to prehospital level of function  Assessment and plan of treatment:	66 y/o female with SDH after mechanical fall, repeat CT head stable  - Take keppra as prescribed x1 week  - Follow up with Dr. Rodriguez outpatient, call the office to make an appointment: [phone]  - Follow up with Dr. Houston (plastic surgery) outpatient, call the office to make an appointment: [phone]  - Follow up with your primary care doctor outpatient  - Call the office or return to ED if develop worsening headache or change in mental status

## 2018-08-09 NOTE — DISCHARGE NOTE ADULT - CARE PROVIDER_API CALL
Frank Rodriguez), Neurosurgery  [address] [place] [address]  Phone: [phone]  Fax: [phone]    Ender Houston), Plastic Surgery; Surgery; Surgery of the Hand  [address]  [address]  [place] [address]  Phone: [phone]  Fax: [phone]

## 2018-08-09 NOTE — PROGRESS NOTE ADULT - SUBJECTIVE AND OBJECTIVE BOX
HPI:  68y/o F with PMHx sig for HLD, on baby aspirin, BIBEMS to ER after sustaining a mechanical fall along the pavement of a sidewalk. Pt reports she was walking and suddenly tripped landing on her face (mainly right side), with associated LOC, and dizziness after. CTH reveals an acute interhemispheric subdural hematoma. CT maxillofacial reveals "multifocal comminuted fracture of the right maxillary sinus, nondisplaced fractures of the right orbital floor and roof with mild inferior extraconal hemorrhage and gas." Pt also has a right eyebrow laceration, which was sutured by plastics. She reports "droplets of blood" from her nose. Pt currently denies headache, N/V, neck pain, acute changes in vision, acute weakness/loss of sensation of extremities, fever, chills, CP, SOB, urinary/bowel incontinence. (08 Aug 2018 17:31)      Hospital course:   8/8: Admitted for SDH s/p mechanical fall   8/9:     OVERNIGHT EVENTS:   Denies acute changes to vision, weakness or loss of sensation.     Vital Signs Last 24 Hrs  T(C): 36.1 (09 Aug 2018 05:29), Max: 37.1 (08 Aug 2018 16:23)  T(F): 97 (09 Aug 2018 05:29), Max: 98.7 (08 Aug 2018 16:23)  HR: 58 (09 Aug 2018 04:22) (56 - 84)  BP: 110/60 (09 Aug 2018 04:22) (110/60 - 137/70)  BP(mean): 79 (09 Aug 2018 04:22) (79 - 88)  RR: 18 (09 Aug 2018 04:22) (17 - 18)  SpO2: 98% (09 Aug 2018 04:22) (95% - 99%)    I&O's Summary    08 Aug 2018 07:01  -  09 Aug 2018 06:32  --------------------------------------------------------  IN: 0 mL / OUT: 201 mL / NET: -201 mL        PHYSICAL EXAM:  	Constitutional: NAD, resting comfortably  	Eyes: R periorbital edema with surrounding ecchymosis  	ENMT: No active rhinorrhea or discharge from nose, chipped teeth  	Neck: Supple, nontender  	Back: Nontender to palpation  	Respiratory: CTA B/L  	Cardiovascular: RRR, +S1/S2  	Gastrointestinal: Abdomen soft, NT/ND  	Neurological: AAox3, FC, speech coherent  	CNII-XII: EOM intact, PERRL, face symmetric, tongue midline  	Motor: MAEx4 5/5 UE and LE B/L, negative protonator drift, negative dysmetria  SILT throughout    TUBES/LINES:  [] Sher  [] Lumbar Drain  [] Wound Drains  [] Others       DIET:  [] NPO  [x] Mechanical  [] Tube feeds    LABS:                        13.1   9.6   )-----------( 198      ( 08 Aug 2018 17:55 )             40.4     08-08    142  |  99  |  21  ----------------------------<  110<H>  4.0   |  30  |  0.74    Ca    9.9      08 Aug 2018 17:55      PT/INR - ( 08 Aug 2018 17:55 )   PT: 11.4 sec;   INR: 1.03          PTT - ( 08 Aug 2018 17:55 )  PTT:32.5 sec        CAPILLARY BLOOD GLUCOSE      POCT Blood Glucose.: 148 mg/dL (08 Aug 2018 13:33)      Drug Levels: [] N/A    CSF Analysis: [] N/A      Allergies    sulfa drugs (Stomach Upset)    Intolerances        Home Medications:  aspirin 81 mg oral tablet: 1 tab(s) orally once a day (08 Aug 2018 14:37)  simvastatin:  (08 Aug 2018 14:37)      MEDICATIONS:  Antibiotics:  cephalexin 500 milliGRAM(s) Oral every 12 hours    Neuro:  acetaminophen   Tablet 650 milliGRAM(s) Oral every 6 hours PRN  acetaminophen   Tablet. 650 milliGRAM(s) Oral every 6 hours PRN  levETIRAcetam 500 milliGRAM(s) Oral two times a day    Anticoagulation:    OTHER:  docusate sodium 100 milliGRAM(s) Oral three times a day  senna 2 Tablet(s) Oral at bedtime PRN  simvastatin 40 milliGRAM(s) Oral at bedtime    IVF:    CULTURES:    RADIOLOGY & ADDITIONAL TESTS:      ASSESSMENT:  68y/o F with multiple comminuted facial fractures and an acute interhemispheric SDH    SDH; FACIAL LACERATION  Handoff  MEWS Score  Hyperlipidemia  Polio  Mitral regurgitation  HSV (herpes simplex virus) infection  Osteoporosis  Eczema  Carotid atherosclerosis, bilateral  SDH (subdural hematoma)  No significant past surgical history  FALL  Dental injury, initial encounter  Facial laceration, initial encounter  Facial fracture due to fall      PLAN:  NEURO:  - Q2 neuro checks   - S/p DDAVP for ASA use   - Cont. Keppra 500mg BID x1 week   - Elev HOB 30 deg   - F/u PRS recs re: facial fx - continue keflex 500mg QID PO x1 week and f/u with Dr. Ender Houston on Tuesday 8/14/18    CARDIOVASCULAR:  - BP goal: normotension     PULMONARY:  - IS     RENAL:  - I&Os     GI:  - Bowel regimen     HEME:  - Trend H/H     ID:  - Monitor for fevers     ENDO:  - ISS     DVT PROPHYLAXIS:  [x] Venodynes only                               [] Heparin/Lovenox    DISPOSITION:  - SDU status   - Full code   - Dispo: pending PT  - D/w Dr. Rodriguez HPI:  66y/o F with PMHx sig for HLD, on baby aspirin, BIBEMS to ER after sustaining a mechanical fall along the pavement of a sidewalk. Pt reports she was walking and suddenly tripped landing on her face (mainly right side), with associated LOC, and dizziness after. CTH reveals an acute interhemispheric subdural hematoma. CT maxillofacial reveals "multifocal comminuted fracture of the right maxillary sinus, nondisplaced fractures of the right orbital floor and roof with mild inferior extraconal hemorrhage and gas." Pt also has a right eyebrow laceration, which was sutured by plastics. She reports "droplets of blood" from her nose. Pt currently denies headache, N/V, neck pain, acute changes in vision, acute weakness/loss of sensation of extremities, fever, chills, CP, SOB, urinary/bowel incontinence. (08 Aug 2018 17:31)      Hospital course:   8/8: Admitted for SDH s/p mechanical fall   8/9: Repeat CTH shows resolution of subdural. Echo and carotid dopplers ordered for syncope w/u. OMFS consult for dental fractures.     OVERNIGHT EVENTS:   Denies acute changes to vision, weakness or loss of sensation.     Vital Signs Last 24 Hrs  T(C): 36.1 (09 Aug 2018 05:29), Max: 37.1 (08 Aug 2018 16:23)  T(F): 97 (09 Aug 2018 05:29), Max: 98.7 (08 Aug 2018 16:23)  HR: 58 (09 Aug 2018 04:22) (56 - 84)  BP: 110/60 (09 Aug 2018 04:22) (110/60 - 137/70)  BP(mean): 79 (09 Aug 2018 04:22) (79 - 88)  RR: 18 (09 Aug 2018 04:22) (17 - 18)  SpO2: 98% (09 Aug 2018 04:22) (95% - 99%)    I&O's Summary    08 Aug 2018 07:01  -  09 Aug 2018 06:32  --------------------------------------------------------  IN: 0 mL / OUT: 201 mL / NET: -201 mL        PHYSICAL EXAM:  	Constitutional: NAD, resting comfortably  	Eyes: R periorbital edema with surrounding ecchymosis  	ENMT: No active rhinorrhea or discharge from nose, chipped teeth  	Neck: Supple, nontender  	Back: Nontender to palpation  	Respiratory: CTA B/L  	Cardiovascular: RRR, +S1/S2  	Gastrointestinal: Abdomen soft, NT/ND  	Neurological: AAox3, FC, speech coherent  	CNII-XII: EOM intact, PERRL, face symmetric, tongue midline  	Motor: MAEx4 5/5 UE and LE B/L, negative pronator drift, negative dysmetria  SILT throughout    TUBES/LINES:  [] Sher  [] Lumbar Drain  [] Wound Drains  [] Others       DIET:  [] NPO  [x] Mechanical  [] Tube feeds    LABS:                        13.1   9.6   )-----------( 198      ( 08 Aug 2018 17:55 )             40.4     08-08    142  |  99  |  21  ----------------------------<  110<H>  4.0   |  30  |  0.74    Ca    9.9      08 Aug 2018 17:55      PT/INR - ( 08 Aug 2018 17:55 )   PT: 11.4 sec;   INR: 1.03          PTT - ( 08 Aug 2018 17:55 )  PTT:32.5 sec        CAPILLARY BLOOD GLUCOSE      POCT Blood Glucose.: 148 mg/dL (08 Aug 2018 13:33)      Drug Levels: [] N/A    CSF Analysis: [] N/A      Allergies    sulfa drugs (Stomach Upset)    Intolerances        Home Medications:  aspirin 81 mg oral tablet: 1 tab(s) orally once a day (08 Aug 2018 14:37)  simvastatin:  (08 Aug 2018 14:37)      MEDICATIONS:  Antibiotics:  cephalexin 500 milliGRAM(s) Oral every 12 hours    Neuro:  acetaminophen   Tablet 650 milliGRAM(s) Oral every 6 hours PRN  acetaminophen   Tablet. 650 milliGRAM(s) Oral every 6 hours PRN  levETIRAcetam 500 milliGRAM(s) Oral two times a day    Anticoagulation:    OTHER:  docusate sodium 100 milliGRAM(s) Oral three times a day  senna 2 Tablet(s) Oral at bedtime PRN  simvastatin 40 milliGRAM(s) Oral at bedtime    IVF:    CULTURES:    RADIOLOGY & ADDITIONAL TESTS:      ASSESSMENT:  66y/o F with multiple comminuted facial fractures and an acute interhemispheric SDH    SDH; FACIAL LACERATION  Handoff  MEWS Score  Hyperlipidemia  Polio  Mitral regurgitation  HSV (herpes simplex virus) infection  Osteoporosis  Eczema  Carotid atherosclerosis, bilateral  SDH (subdural hematoma)  No significant past surgical history  FALL  Dental injury, initial encounter  Facial laceration, initial encounter  Facial fracture due to fall      PLAN:  NEURO:  - Q2 neuro checks   - S/p DDAVP for ASA use   - Cont. Keppra 500mg BID x1 week   - Elev HOB 30 deg   - F/u PRS recs re: facial fx - continue keflex 500mg QID PO x1 week and f/u with Dr. Ender Houston on Tuesday 8/14/18  - F/u OMFS consult  - Dental f/u o/p    CARDIOVASCULAR:  - BP goal: normotension   - F/u sycnope w/u: echo and carotid dopplers   - F/u Dr. John still     PULMONARY:  - IS     RENAL:  - I&Os     GI:  - Bowel regimen     HEME:  - Trend H/H     ID:  - Monitor for fevers     ENDO:  - ISS     DVT PROPHYLAXIS:  [x] Venodynes only                               [] Heparin/Lovenox    DISPOSITION:  - SDU status   - Full code   - Dispo: pending PT  - D/w Dr. Rodriguez

## 2018-08-09 NOTE — DISCHARGE NOTE ADULT - NS AS ACTIVITY OBS
Walking-Indoors allowed/No Heavy lifting/straining/Showering allowed/Walking-Outdoors allowed/Bathing allowed

## 2018-08-09 NOTE — DISCHARGE NOTE ADULT - CONDITION (STATED IN TERMS THAT PERMIT A SPECIFIC MEASURABLE COMPARISON WITH CONDITION ON ADMISSION):
Vital signs are stable, afebrile. No neurological deficit. Has headache. Cleared for discharge by PT and Dr. Rodriguez

## 2018-08-09 NOTE — PHYSICAL THERAPY INITIAL EVALUATION ADULT - MODALITIES TREATMENT COMMENTS
smile symmetrical, tongue midline, eyes open/close and raise eyebrows intact. H-test and quadrant test intact

## 2018-08-09 NOTE — PHYSICAL THERAPY INITIAL EVALUATION ADULT - ADDITIONAL COMMENTS
Pt lives at home alone, elevator access, denies MARYANA. PTA: indep with functional mobility, no AD. right hand dominant, and eyeglasses for correctives.

## 2018-08-09 NOTE — PHYSICAL THERAPY INITIAL EVALUATION ADULT - GENERAL OBSERVATIONS, REHAB EVAL
Rec'd pt seated in bed side chair, non-acute distress, +call bell, +EKG, cleared for PT and OOB activity by Katelynn KAHN and Tiff love.

## 2018-08-09 NOTE — DISCHARGE NOTE ADULT - CARE PROVIDERS DIRECT ADDRESSES
,[email or web address]

## 2018-08-09 NOTE — PHYSICAL THERAPY INITIAL EVALUATION ADULT - PERTINENT HX OF CURRENT PROBLEM, REHAB EVAL
66 yo F BIBEMS to ER after sustaining a mechanical fall along the pavement of a sidewalk. Pt reports she was walking and suddenly tripped landing on her face (mainly right side), with associated LOC, and dizziness after. CTH reveals an acute interhemispheric subdural hematoma. CT maxillofacial reveals "multifocal comminuted fracture of the right maxillary sinus, nondisplaced fractures of the right orbital floor and roof with mild inferior extraconal hemorrhage and gas."

## 2018-08-09 NOTE — PROGRESS NOTE ADULT - SUBJECTIVE AND OBJECTIVE BOX
=================================  NEUROCRITICAL CARE ATTENDING NOTE  =================================    DYANA YANG   [id number]  Summary:  67y/F  with dyslipidemia on ASA, presented with mechanical fall, landing on R face, (+) LOC, dizziness.  Brought to ED where CT showed acute interhemispheric SDH, multifocal comminuted fracture R max sinus, nondisplaced Fx R orbital floor, roof with mild inferior extracoronal hemorrhage and gas.  Admitted to 8.  Overnight Events: No significant events overnight.  REVIEW OF SYSTEMS:  No headaches, no nausea or vomiting; 14 -point review of systems otherwise unremarkable.    Past Medical History: Hyperlipidemia Polio Mitral regurgitation HSV (herpes simplex virus) infection Osteoporosis EczemaCarotid atherosclerosis, bilateral  Allergies:  sulfa drugs (Stomach Upset)  Home meds: ASA 81mg daily simvastatin    PHYSICAL EXAMINATION  T(C): 36.6 ( @ 14:30), Max: 37.1 ( @ 16:23) HR: 56 ( @ 08:36) (56 - 84) BP: 116/56 ( @ 08:36) (110/60 - 137/70) RR: 17 ( @ 08:36) (17 - 18) SpO2: 98% ( @ 08:36) (95% - 99%)  NEUROLOGIC EXAMINATION:  Patient is awake, alert, fully oriented, follows commands, face symmetric, tongue midline, pupils 3mm equal and briskly reactive to light, EOMs intact, muscle strength 5/5 on all 4 extremities  GENERAL:  not intubated, not in cardiorespiratory distress  EENT: anicteric, R periorbital edema, ecchymosis  CARDIOVASC:  (+) S1 S2, bradycardic rate and regular rhythm  PULMONARY:  clear to auscultation bilaterally  ABDOMEN:  soft, nontender, with normoactive bowel sounds  EXTREMITIES:  no edema  SKIN:  no rash    LABS:             11.9   5.9   )-----------( 187      ( 09 Aug 2018 06:14 )             36.3     142  |  104  |  16  ----------------------------<  82  3.9   |  29  |  0.67    Ca    9.3      09 Aug 2018 06:14  Phos  3.7     -  Mg     2.2      @ 07:01  -   @ 07:00  IN: 150 mL / OUT: 201 mL / NET: -51 mL    Bacteriology:  CSF studies:  EEG:  Neuroimagin/09 CT:  improvement of acute SDH   CT maxillofacial:  multifocal comminuted Fx R max sinus, nondisplaced Fx R orbital floor and roof with mild inferior extraconal hemorrhage and gas, orbital floor fracture   CT:  acute interhemispheric SDH  Other imaging:    MEDICATIONS: cephalexin 500mg PO q12h docusate levetiracetam 500 PO BID simvastatin 40mg HS senna 2mg HS    IV FLUIDS: IVL  DRIPS:  DIET: regular diet  Lines:  Drains:    Wounds:    CODE STATUS:  Full Code                       GOALS OF CARE:  aggressive                      DISPOSITION:  8[place]

## 2018-08-09 NOTE — DISCHARGE NOTE ADULT - PLAN OF CARE
Return to prehospital level of function 66 y/o female with SDH after mechanical fall, repeat CT head stable  - Take keppra as prescribed x1 week  - Follow up with Dr. Rodriguez outpatient, call the office to make an appointment: [phone]  - Follow up with Dr. Houston (plastic surgery) outpatient, call the office to make an appointment: [phone]  - Follow up with your primary care doctor outpatient  - Call the office or return to ED if develop worsening headache or change in mental status

## 2018-08-09 NOTE — CONSULT NOTE ADULT - SUBJECTIVE AND OBJECTIVE BOX
Patient is a 67y old  Female who presents with a chief complaint of SDH s/p mechanical fall (08 Aug 2018 17:31)        HPI:  68y/o F with PMHx sig for HLD, on baby aspirin, BIBEMS to ER after sustaining a mechanical fall along the pavement of a sidewalk. Pt reports she was walking and suddenly tripped landing on her face (mainly right side), with associated LOC, and dizziness after. CTH reveals an acute interhemispheric subdural hematoma. CT maxillofacial reveals "multifocal comminuted fracture of the right maxillary sinus, nondisplaced fractures of the right orbital floor and roof with mild inferior extraconal hemorrhage and gas." Pt also has a right eyebrow laceration, which was sutured by plastics. She reports "droplets of blood" from her nose. Pt currently denies headache, N/V, neck pain, acute changes in vision, acute weakness/loss of sensation of extremities, fever, chills, CP, SOB, urinary/bowel incontinence. (08 Aug 2018 17:31)     Falling episodes - no new weakness - mild headaches - facial and head trauma     Awake and alert- no aphasia      Allergies  sulfa drugs (Stomach Upset)      Health Issues  SDH; FACIAL LACERATION  Handoff  MEWS Score  Hyperlipidemia  Polio  Mitral regurgitation  HSV (herpes simplex virus) infection  Osteoporosis  Eczema  Carotid atherosclerosis, bilateral  SDH (subdural hematoma)  No significant past surgical history  FALL  Dental injury, initial encounter  Facial laceration, initial encounter  Facial fracture due to fall        FAMILY HISTORY:      MEDICATIONS  (STANDING):  cephalexin 500 milliGRAM(s) Oral every 12 hours  docusate sodium 100 milliGRAM(s) Oral three times a day  levETIRAcetam 500 milliGRAM(s) Oral two times a day  simvastatin 40 milliGRAM(s) Oral at bedtime    MEDICATIONS  (PRN):  acetaminophen   Tablet 650 milliGRAM(s) Oral every 6 hours PRN For Temp greater than 38 C (100.4 F)  acetaminophen   Tablet. 650 milliGRAM(s) Oral every 6 hours PRN Mild Pain (1 - 3)  senna 2 Tablet(s) Oral at bedtime PRN Constipation      PAST MEDICAL & SURGICAL HISTORY:  Hyperlipidemia  Polio  Mitral regurgitation  HSV (herpes simplex virus) infection  Osteoporosis  Eczema  Carotid atherosclerosis, bilateral  No significant past surgical history      Labs                          11.9   5.9   )-----------( 187      ( 09 Aug 2018 06:14 )             36.3     08-09    142  |  104  |  16  ----------------------------<  82  3.9   |  29  |  0.67    Ca    9.3      09 Aug 2018 06:14  Phos  3.7     08-09  Mg     2.2     08-09        Radiology:    Physical Exam    MENTAL STATUS  -Level of Consciousness- awake    Orientation- person, place time  Language- aphasia/ dysarthria- nl  Memory- recent and remote- nl      Cranial Nerve 1- 12  Pupils- equal and reactive  Eye movements- full  Facial - no asymmetry   Lower CN-nl    Gait and Station- no foot drop    MOTOR  Upper-nl  Lower-nl    Reflexes- intact    Sensation- no sensory level    Cerebellar- no tremors    vascular - no bruits    Assessment- Subdural    Plan as per Dr Rodriguez

## 2018-08-09 NOTE — PROGRESS NOTE ADULT - ASSESSMENT
67y/F with  1.  acute interhemispheric SDH  2.  multifocal comminuted fractureR maxillary sinus, nondisplaced R orbital floor and roof fracture,   3.  dyslipidemia, bilateral carotid atherosclerosis  4.  mitral regurgitation  5. Osteoporosis  6.  h/o polio HSV infection    PLAN:   NEURO: neurochecks q4h, PRN pain meds with Tylenol  SDH:  no surgical plans, continue seizure prophylaxis: levetiracetam 500mg BID x 7 days  REHAB:  physical therapy evaluation and management    EARLY MOB:  HOB up    PULM:  Room air, incentive spirometry  CARDIO:  SBP goal 100-150mm Hg; syncope w/u c/o internal medicine; carotid dopplers  ENDO:  Blood sugar goals 140-180 mg/dL, continue simvastatin  GI:  PPI for GI prophylaxis  DIET: regular diet  RENAL:  IVL  HEM/ONC: given DDAVP for ASA use  VTE Prophylaxis: SCDs, no DVT chemoprophylaxis for now as patient is high risk for bleed (fresh bleed)  ID: afebrile, no leukocytosis; cephalexin as per PRS  Social: will update family  MISC:  OMFS consult for maxillofacial fractures    ATTENDING ATTESTATION:  I was physically present for the key portions of the evaluation and management (E/M) service provided.  I agree with the above history, physical and plan which I have reviewed and edited where appropriate.     Patient not at high risk for neurologic deterioration / death.  Time spent on this noncritically ill patient: 45 minutes spent on total encounter, more than 50% of the visit was spent counseling and/or coordinating care by the attending physician.    Plan discussed with RN, house staff.

## 2018-08-09 NOTE — DISCHARGE NOTE ADULT - MEDICATION SUMMARY - MEDICATIONS TO TAKE
I will START or STAY ON the medications listed below when I get home from the hospital:    acetaminophen 325 mg oral tablet  -- 2 tab(s) by mouth every 6 hours, As needed, For Temp greater than 38 C (100.4 F)  -- Indication: For fever    acetaminophen 325 mg oral tablet  -- 2 tab(s) by mouth every 6 hours, As needed, Mild Pain (1 - 3)  -- Indication: For Pain    Keppra 500 mg oral tablet  -- 1 tab(s) by mouth 2 times a day MDD:2  -- Indication: For Seizure prevention    simvastatin  -- Indication: For HLD    Keflex 500 mg oral capsule  -- 1 cap(s) by mouth every 12 hours MDD:2  -- Indication: For infection prevention    docusate sodium 100 mg oral capsule  -- 1 cap(s) by mouth 3 times a day  -- Indication: For bowel regimen

## 2018-08-09 NOTE — DISCHARGE NOTE ADULT - PATIENT PORTAL LINK FT
You can access the [facility] Patient Portal, offered by [facility], by registering with the following website: [email or web address]

## 2018-08-09 NOTE — DISCHARGE NOTE ADULT - NS AS DC AMI YN
Operative Note





- Note:


Operative Date: 02/07/18


Pre-Operative Diagnosis: Retained POC.  partial molar pregnancy


Operation: Suction, D&C.  Real-time US guidance


Findings: 





Normal uterus, RPOC noted


Post-Operative Diagnosis: Same as Pre-op


Surgeon: Papito Harris


Anesthesiologist/CRNA: Yecenia Roque


Anesthesia: General


Specimens Removed: POC


Estimated Blood Loss (mls): 10


Blood Volume Replaced (mls): 0


Fluid Volume Replaced (mls): 300


Operative Report Dictated: Yes no

## 2018-08-09 NOTE — DISCHARGE NOTE ADULT - ADDITIONAL INSTRUCTIONS
- Take keppra as prescribed x1 week  - Follow up with Dr. Rodriguez outpatient, call the office to make an appointment: [phone]  - Follow up with Dr. Houston (plastic surgery) outpatient, call the office to make an appointment: [phone]  - Follow up with your primary care doctor outpatient  - Call the office or return to ED if develop worsening headache or change in mental status

## 2018-08-09 NOTE — DISCHARGE NOTE ADULT - HOSPITAL COURSE
HPI:  68y/o F with PMHx sig for HLD, on baby aspirin, BIBEMS to ER after sustaining a mechanical fall along the pavement of a sidewalk. Pt reports she was walking and suddenly tripped landing on her face (mainly right side), with associated LOC, and dizziness after. CTH reveals an acute interhemispheric subdural hematoma. CT maxillofacial reveals "multifocal comminuted fracture of the right maxillary sinus, nondisplaced fractures of the right orbital floor and roof with mild inferior extraconal hemorrhage and gas." Pt also has a right eyebrow laceration, which was sutured by plastics. She reports "droplets of blood" from her nose. Pt currently denies headache, N/V, neck pain, acute changes in vision, acute weakness/loss of sensation of extremities, fever, chills, CP, SOB, urinary/bowel incontinence.    Hospital course:   8/8: Admitted for SDH s/p mechanical fall   8/9: Repeat CTH shows resolution of subdural. Echo and carotid dopplers ordered for syncope w/u. OMFS consult for dental fractures.

## 2018-08-10 VITALS — TEMPERATURE: 98 F

## 2018-08-10 LAB
ANION GAP SERPL CALC-SCNC: 8 MMOL/L — SIGNIFICANT CHANGE UP (ref 5–17)
BUN SERPL-MCNC: 20 MG/DL — SIGNIFICANT CHANGE UP (ref 7–23)
CALCIUM SERPL-MCNC: 9.2 MG/DL — SIGNIFICANT CHANGE UP (ref 8.4–10.5)
CHLORIDE SERPL-SCNC: 94 MMOL/L — LOW (ref 96–108)
CO2 SERPL-SCNC: 30 MMOL/L — SIGNIFICANT CHANGE UP (ref 22–31)
CREAT SERPL-MCNC: 0.7 MG/DL — SIGNIFICANT CHANGE UP (ref 0.5–1.3)
GLUCOSE SERPL-MCNC: 104 MG/DL — HIGH (ref 70–99)
HCT VFR BLD CALC: 36.8 % — SIGNIFICANT CHANGE UP (ref 34.5–45)
HGB BLD-MCNC: 11.7 G/DL — SIGNIFICANT CHANGE UP (ref 11.5–15.5)
MAGNESIUM SERPL-MCNC: 2 MG/DL — SIGNIFICANT CHANGE UP (ref 1.6–2.6)
MCHC RBC-ENTMCNC: 30.8 PG — SIGNIFICANT CHANGE UP (ref 27–34)
MCHC RBC-ENTMCNC: 31.8 G/DL — LOW (ref 32–36)
MCV RBC AUTO: 96.8 FL — SIGNIFICANT CHANGE UP (ref 80–100)
PHOSPHATE SERPL-MCNC: 3.3 MG/DL — SIGNIFICANT CHANGE UP (ref 2.5–4.5)
PLATELET # BLD AUTO: 180 K/UL — SIGNIFICANT CHANGE UP (ref 150–400)
POTASSIUM SERPL-MCNC: 5.6 MMOL/L — HIGH (ref 3.5–5.3)
POTASSIUM SERPL-SCNC: 5.6 MMOL/L — HIGH (ref 3.5–5.3)
RBC # BLD: 3.8 M/UL — SIGNIFICANT CHANGE UP (ref 3.8–5.2)
RBC # FLD: 13.3 % — SIGNIFICANT CHANGE UP (ref 10.3–16.9)
SODIUM SERPL-SCNC: 132 MMOL/L — LOW (ref 135–145)
WBC # BLD: 5.4 K/UL — SIGNIFICANT CHANGE UP (ref 3.8–10.5)
WBC # FLD AUTO: 5.4 K/UL — SIGNIFICANT CHANGE UP (ref 3.8–10.5)

## 2018-08-10 PROCEDURE — 83735 ASSAY OF MAGNESIUM: CPT

## 2018-08-10 PROCEDURE — 12051 INTMD RPR FACE/MM 2.5 CM/<: CPT

## 2018-08-10 PROCEDURE — 82962 GLUCOSE BLOOD TEST: CPT

## 2018-08-10 PROCEDURE — 93306 TTE W/DOPPLER COMPLETE: CPT

## 2018-08-10 PROCEDURE — 70486 CT MAXILLOFACIAL W/O DYE: CPT

## 2018-08-10 PROCEDURE — 93005 ELECTROCARDIOGRAM TRACING: CPT

## 2018-08-10 PROCEDURE — 85730 THROMBOPLASTIN TIME PARTIAL: CPT

## 2018-08-10 PROCEDURE — 90471 IMMUNIZATION ADMIN: CPT

## 2018-08-10 PROCEDURE — 86900 BLOOD TYPING SEROLOGIC ABO: CPT

## 2018-08-10 PROCEDURE — 99285 EMERGENCY DEPT VISIT HI MDM: CPT | Mod: 25

## 2018-08-10 PROCEDURE — 80048 BASIC METABOLIC PNL TOTAL CA: CPT

## 2018-08-10 PROCEDURE — 36415 COLL VENOUS BLD VENIPUNCTURE: CPT

## 2018-08-10 PROCEDURE — 90715 TDAP VACCINE 7 YRS/> IM: CPT

## 2018-08-10 PROCEDURE — 99231 SBSQ HOSP IP/OBS SF/LOW 25: CPT | Mod: 24

## 2018-08-10 PROCEDURE — 85025 COMPLETE CBC W/AUTO DIFF WBC: CPT

## 2018-08-10 PROCEDURE — 70450 CT HEAD/BRAIN W/O DYE: CPT

## 2018-08-10 PROCEDURE — 85610 PROTHROMBIN TIME: CPT

## 2018-08-10 PROCEDURE — 85027 COMPLETE CBC AUTOMATED: CPT

## 2018-08-10 PROCEDURE — 86850 RBC ANTIBODY SCREEN: CPT

## 2018-08-10 PROCEDURE — 97161 PT EVAL LOW COMPLEX 20 MIN: CPT

## 2018-08-10 PROCEDURE — 93880 EXTRACRANIAL BILAT STUDY: CPT

## 2018-08-10 PROCEDURE — 86901 BLOOD TYPING SEROLOGIC RH(D): CPT

## 2018-08-10 PROCEDURE — 84100 ASSAY OF PHOSPHORUS: CPT

## 2018-08-10 RX ORDER — CEPHALEXIN 500 MG
1 CAPSULE ORAL
Qty: 14 | Refills: 0
Start: 2018-08-10 | End: 2018-08-16

## 2018-08-10 RX ORDER — LEVETIRACETAM 250 MG/1
1 TABLET, FILM COATED ORAL
Qty: 14 | Refills: 0
Start: 2018-08-10 | End: 2018-08-16

## 2018-08-10 RX ORDER — DOCUSATE SODIUM 100 MG
1 CAPSULE ORAL
Qty: 0 | Refills: 0 | DISCHARGE
Start: 2018-08-10

## 2018-08-10 RX ORDER — ACETAMINOPHEN 500 MG
2 TABLET ORAL
Qty: 0 | Refills: 0 | DISCHARGE
Start: 2018-08-10

## 2018-08-10 RX ORDER — ASPIRIN/CALCIUM CARB/MAGNESIUM 324 MG
1 TABLET ORAL
Qty: 0 | Refills: 0 | COMMUNITY

## 2018-08-10 RX ADMIN — Medication 500 MILLIGRAM(S): at 05:52

## 2018-08-10 RX ADMIN — Medication 100 MILLIGRAM(S): at 05:52

## 2018-08-10 RX ADMIN — LEVETIRACETAM 500 MILLIGRAM(S): 250 TABLET, FILM COATED ORAL at 05:52

## 2018-08-10 NOTE — PROGRESS NOTE ADULT - SUBJECTIVE AND OBJECTIVE BOX
HPI:  66y/o F with PMHx sig for HLD, on baby aspirin, BIBEMS to ER after sustaining a mechanical fall along the pavement of a sidewalk. Pt reports she was walking and suddenly tripped landing on her face (mainly right side), with associated LOC, and dizziness after. CTH reveals an acute interhemispheric subdural hematoma. CT maxillofacial reveals "multifocal comminuted fracture of the right maxillary sinus, nondisplaced fractures of the right orbital floor and roof with mild inferior extraconal hemorrhage and gas." Pt also has a right eyebrow laceration, which was sutured by plastics. She reports "droplets of blood" from her nose. Pt currently denies headache, N/V, neck pain, acute changes in vision, acute weakness/loss of sensation of extremities, fever, chills, CP, SOB, urinary/bowel incontinence. (08 Aug 2018 17:31)  Hospital course:  8/10/18: No major event at night. Complaints of headache and hunger.  Cleared for home by PT.  OVERNIGHT EVENTS: No major events.  Vital Signs Last 24 Hrs  T(C): 36.1 (10 Aug 2018 05:05), Max: 37.4 (09 Aug 2018 16:58)  T(F): 97 (10 Aug 2018 05:05), Max: 99.4 (09 Aug 2018 16:58)  HR: 54 (10 Aug 2018 04:50) (48 - 70)  BP: 142/65 (10 Aug 2018 04:50) (99/56 - 142/65)  BP(mean): 93 (10 Aug 2018 04:50) (74 - 93)  RR: 16 (10 Aug 2018 04:50) (16 - 20)  SpO2: 96% (10 Aug 2018 04:50) (90% - 98%)    I&O's Summary    09 Aug 2018 07:01  -  10 Aug 2018 07:00  --------------------------------------------------------  IN: 550 mL / OUT: 900 mL / NET: -350 mL    10 Aug 2018 07:01  -  10 Aug 2018 08:36  --------------------------------------------------------  IN: 0 mL / OUT: 200 mL / NET: -200 mL        PHYSICAL EXAM:  Neurological: A&O x 3, PERRL. EOMI. NO visual deficit. R.eye echmosis, no edema.    Motor exam: No focal motor deficit 5/5 x 4         [x] Upper extremity              Bi(c5)  WE(c6)  EE(c7)   FF(c8)                                                R         5/5        5/5        5/5       5/5                                               L          5/5        5/5        5/5       5/5         [x] Lower extremeity          HF(l2)   KE(l3)    TA(l4)   EHL(l5)  GS(s1)                                                 R        5/5        5/5        5/5       5/5         5/5                                               L         5/5        5/5       5/5       5/5          5/5                                                        [x] warm well perfused; capillary refill <3 seconds     Sensation: [x] intact to light touch  [] decreased:   DIET: Regular  LABS:                        11.7   5.4   )-----------( 180      ( 10 Aug 2018 05:53 )             36.8     08-10    132<L>  |  94<L>  |  20  ----------------------------<  104<H>  5.6<H>   |  30  |  0.70    Ca    9.2      10 Aug 2018 05:53  Phos  3.3     08-10  Mg     2.0     08-10      PT/INR - ( 09 Aug 2018 06:15 )   PT: 11.3 sec;   INR: 1.02          PTT - ( 09 Aug 2018 06:15 )  PTT:28.6 sec        CAPILLARY BLOOD GLUCOSE          Drug Levels: [] N/A    CSF Analysis: [] N/A      Allergies    sulfa drugs (Stomach Upset)    Intolerances      MEDICATIONS:  Antibiotics:  cephalexin 500 milliGRAM(s) Oral every 12 hours    Neuro:  acetaminophen   Tablet 650 milliGRAM(s) Oral every 6 hours PRN  acetaminophen   Tablet. 650 milliGRAM(s) Oral every 6 hours PRN  levETIRAcetam 500 milliGRAM(s) Oral two times a day    Anticoagulation:    OTHER:  docusate sodium 100 milliGRAM(s) Oral three times a day  senna 2 Tablet(s) Oral at bedtime PRN  simvastatin 40 milliGRAM(s) Oral at bedtime    IVF:    CULTURES:    RADIOLOGY & ADDITIONAL TESTS:      ASSESSMENT:  67y Female with traumatic interhemispheric SDH without neurological deficit    PLAN:  Discharge home today  Keflex x 1 week  Keppra x 1 week  F/U with Dr. Rodriguez    DVT PROPHYLAXIS:  [] Venodynes                                [x] Heparin/Lovenox    DISPOSITION: Home with  no needs

## 2018-08-10 NOTE — PROGRESS NOTE ADULT - ASSESSMENT
67y/F with  1.  acute interhemispheric SDH  2.  multifocal comminuted fractureR maxillary sinus, nondisplaced R orbital floor and roof fracture,   3.  dyslipidemia, bilateral carotid atherosclerosis  4.  mitral regurgitation  5. Osteoporosis  6.  h/o polio HSV infection    PLAN:   NEURO: neurochecks q4h, PRN pain meds with Tylenol  SDH:  no surgical plans, continue seizure prophylaxis: levetiracetam 500mg BID x 7 days  REHAB:  physical therapy evaluation and management    EARLY MOB:  HOB up    PULM:  Room air, incentive spirometry  CARDIO:  SBP goal 100-150mm Hg; syncope w/u c/o internal medicine; carotid dopplers  ENDO:  Blood sugar goals 140-180 mg/dL, continue simvastatin  GI:  PPI for GI prophylaxis  DIET: regular diet  RENAL:  IVL  HEM/ONC: given DDAVP for ASA use  VTE Prophylaxis: SCDs, no DVT chemoprophylaxis for now as patient is high risk for bleed (fresh bleed)  ID: afebrile, no leukocytosis; cephalexin as per PRS  Social: will update family  MISC:  OMFS consult for maxillofacial fractures    ATTENDING ATTESTATION:  I was physically present for the key portions of the evaluation and management (E/M) service provided.  I agree with the above history, physical and plan which I have reviewed and edited where appropriate.     Patient not at high risk for neurologic deterioration / death.  Time spent on this noncritically ill patient: 45 minutes spent on total encounter, more than 50% of the visit was spent counseling and/or coordinating care by the attending physician.    Plan discussed with RN, house staff. 67y/F with  1.  acute interhemispheric SDH  2.  multifocal comminuted fractureR maxillary sinus, nondisplaced R orbital floor and roof fracture,   3.  dyslipidemia, bilateral carotid atherosclerosis  4.  mitral regurgitation  5. Osteoporosis  6.  h/o polio HSV infection    PLAN:   NEURO: neurochecks q4h, PRN pain meds with Tylenol  SDH:  no surgical plans, continue seizure prophylaxis: levetiracetam 500mg BID to complete 7 days  REHAB:  physical therapy evaluation and management    EARLY MOB:  OOB to chair, ambulate as tolerated    PULM:  Room air, incentive spirometry  CARDIO:  SBP goal 100-150mm Hg; syncope w/u c/o internal medicine; carotid dopplers  ENDO:  Blood sugar goals 140-180 mg/dL, continue simvastatin  GI:  PPI for GI prophylaxis  DIET: regular diet  RENAL:  IVL  HEM/ONC: given DDAVP for ASA use  VTE Prophylaxis: SCDs, no DVT chemoprophylaxis (patient for discharge)  ID: afebrile, no leukocytosis; cephalexin as per plastics  Social: will update family  DISPO: discharge    ATTENDING ATTESTATION:  I was physically present for the key portions of the evaluation and management (E/M) service provided.  I agree with the above history, physical and plan which I have reviewed and edited where appropriate.     Patient not at high risk for neurologic deterioration / death.  Time spent on this noncritically ill patient: 45 minutes spent on total encounter, more than 50% of the visit was spent counseling and/or coordinating care by the attending physician.    Plan discussed with RN, house staff.

## 2018-08-10 NOTE — PROGRESS NOTE ADULT - SUBJECTIVE AND OBJECTIVE BOX
=================================  NEUROCRITICAL CARE ATTENDING NOTE  =================================    DYANA YANG   MRN-[id number]  Summary:  67y/F  with dyslipidemia on ASA, presented with mechanical fall, landing on R face, (+) LOC, dizziness.  Brought to ED where CT showed acute interhemispheric SDH, multifocal comminuted fracture R max sinus, nondisplaced Fx R orbital floor, roof with mild inferior extracoronal hemorrhage and gas.  Admitted to 8.  Overnight Events: No significant events overnight.  REVIEW OF SYSTEMS:  No headaches, no nausea or vomiting; 14 -point review of systems otherwise unremarkable.    Past Medical History: Hyperlipidemia Polio Mitral regurgitation HSV (herpes simplex virus) infection Osteoporosis EczemaCarotid atherosclerosis, bilateral  Allergies:  sulfa drugs (Stomach Upset)  Home meds: ASA 81mg daily simvastatin    PHYSICAL EXAMINATION  T(C): 36.1 (08-10 @ 05:05), Max: 37.4 ( @ 16:58) HR: 54 (08-10 @ 04:50) (48 - 70) BP: 142/65 (08-10 @ 04:50) (99/56 - 142/65) RR: 16 (08-10 @ 04:50) (16 - 20) SpO2: 96% (08-10 @ 04:50) (90% - 98%)  NEUROLOGIC EXAMINATION:  Patient is awake, alert, fully oriented, follows commands, face symmetric, tongue midline, pupils 3mm equal and briskly reactive to light, EOMs intact, muscle strength 5/5 on all 4 extremities  GENERAL:  not intubated, not in cardiorespiratory distress  EENT: anicteric, R periorbital edema, ecchymosis  CARDIOVASC:  (+) S1 S2, bradycardic rate and regular rhythm  PULMONARY:  clear to auscultation bilaterally  ABDOMEN:  soft, nontender, with normoactive bowel sounds  EXTREMITIES:  no edema  SKIN:  no rash    LABS:             11.7   5.4   )-----------( 180      ( 10 Aug 2018 05:53 )             36.8     132<L>  |  94<L>  |  20  ----------------------------<  104<H>  5.6<H>   |  30  |  0.70    Ca    9.2      10 Aug 2018 05:53  Phos  3.3     08-10  Mg     2.0     08-10    08-09 @ 07:01  -  08-10 @ 07:00  IN: 550 mL / OUT: 900 mL / NET: -350 mL    Bacteriology:  CSF studies:  EEG:  Neuroimagin/09 CT:  improvement of acute SDH   CT maxillofacial:  multifocal comminuted Fx R max sinus, nondisplaced Fx R orbital floor and roof with mild inferior extraconal hemorrhage and gas, orbital floor fracture   CT:  acute interhemispheric SDH  Other imaging:    MEDICATIONS: cephalexin 500mg q12h docusate levetiracetam 500 BID senna PRN simvastatin 40mg HS    IV FLUIDS: IVL  DRIPS:  DIET: regular diet  Lines:  Drains:    Wounds:    CODE STATUS:  Full Code                       GOALS OF CARE:  aggressive                      DISPOSITION:  [facility]

## 2018-08-10 NOTE — PROGRESS NOTE ADULT - SUBJECTIVE AND OBJECTIVE BOX
Neurology Follow up note    Name  DYANA YANG    HPI:  66y/o F with PMHx sig for HLD, on baby aspirin, BIBEMS to ER after sustaining a mechanical fall along the pavement of a sidewalk. Pt reports she was walking and suddenly tripped landing on her face (mainly right side), with associated LOC, and dizziness after. CTH reveals an acute interhemispheric subdural hematoma. CT maxillofacial reveals "multifocal comminuted fracture of the right maxillary sinus, nondisplaced fractures of the right orbital floor and roof with mild inferior extraconal hemorrhage and gas." Pt also has a right eyebrow laceration, which was sutured by plastics. She reports "droplets of blood" from her nose. Pt currently denies headache, N/V, neck pain, acute changes in vision, acute weakness/loss of sensation of extremities, fever, chills, CP, SOB, urinary/bowel incontinence. (08 Aug 2018 17:31)      Interval History - no change in neuro status - no new tremors - no new weakness         REVIEW OF SYSTEMS    Vital Signs Last 24 Hrs  T(C): 36.1 (10 Aug 2018 05:05), Max: 37.4 (09 Aug 2018 16:58)  T(F): 97 (10 Aug 2018 05:05), Max: 99.4 (09 Aug 2018 16:58)  HR: 54 (10 Aug 2018 04:50) (48 - 70)  BP: 142/65 (10 Aug 2018 04:50) (99/56 - 142/65)  BP(mean): 93 (10 Aug 2018 04:50) (74 - 93)  RR: 16 (10 Aug 2018 04:50) (16 - 20)  SpO2: 96% (10 Aug 2018 04:50) (90% - 98%)    Physical Exam-     Mental Status-    Cranial Nerves- full EOM     Gait and station-n/a    Motor- moves all 4 extremities    Reflexes- intact    Sensation- no sensory level    Coordination- no tremors    Vascular -no bruits    Medications  acetaminophen   Tablet 650 milliGRAM(s) Oral every 6 hours PRN  acetaminophen   Tablet. 650 milliGRAM(s) Oral every 6 hours PRN  cephalexin 500 milliGRAM(s) Oral every 12 hours  docusate sodium 100 milliGRAM(s) Oral three times a day  levETIRAcetam 500 milliGRAM(s) Oral two times a day  senna 2 Tablet(s) Oral at bedtime PRN  simvastatin 40 milliGRAM(s) Oral at bedtime      Lab      Radiology    Assessment- Subdural     Plan as per NS

## 2018-08-13 PROBLEM — B00.9 HERPESVIRAL INFECTION, UNSPECIFIED: Chronic | Status: ACTIVE | Noted: 2018-08-08

## 2018-08-13 PROBLEM — A80.9 ACUTE POLIOMYELITIS, UNSPECIFIED: Chronic | Status: ACTIVE | Noted: 2018-08-08

## 2018-08-13 PROBLEM — E78.5 HYPERLIPIDEMIA, UNSPECIFIED: Chronic | Status: ACTIVE | Noted: 2018-08-08

## 2018-08-13 PROBLEM — L30.9 DERMATITIS, UNSPECIFIED: Chronic | Status: ACTIVE | Noted: 2018-08-08

## 2018-08-13 PROBLEM — I34.0 NONRHEUMATIC MITRAL (VALVE) INSUFFICIENCY: Chronic | Status: ACTIVE | Noted: 2018-08-08

## 2018-08-13 PROBLEM — I65.23 OCCLUSION AND STENOSIS OF BILATERAL CAROTID ARTERIES: Chronic | Status: ACTIVE | Noted: 2018-08-08

## 2018-08-13 PROBLEM — M81.0 AGE-RELATED OSTEOPOROSIS WITHOUT CURRENT PATHOLOGICAL FRACTURE: Chronic | Status: ACTIVE | Noted: 2018-08-08
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
## 2018-08-14 DIAGNOSIS — L30.9 DERMATITIS, UNSPECIFIED: ICD-10-CM

## 2018-08-14 DIAGNOSIS — I34.0 NONRHEUMATIC MITRAL (VALVE) INSUFFICIENCY: ICD-10-CM

## 2018-08-14 DIAGNOSIS — Z88.2 ALLERGY STATUS TO SULFONAMIDES: ICD-10-CM

## 2018-08-14 DIAGNOSIS — S02.31XA FRACTURE OF ORBITAL FLOOR, RIGHT SIDE, INITIAL ENCOUNTER FOR CLOSED FRACTURE: ICD-10-CM

## 2018-08-14 DIAGNOSIS — S06.5X9A TRAUMATIC SUBDURAL HEMORRHAGE WITH LOSS OF CONSCIOUSNESS OF UNSPECIFIED DURATION, INITIAL ENCOUNTER: ICD-10-CM

## 2018-08-14 DIAGNOSIS — Z86.12 PERSONAL HISTORY OF POLIOMYELITIS: ICD-10-CM

## 2018-08-14 DIAGNOSIS — S02.40CA MAXILLARY FRACTURE, RIGHT SIDE, INITIAL ENCOUNTER FOR CLOSED FRACTURE: ICD-10-CM

## 2018-08-14 DIAGNOSIS — E78.5 HYPERLIPIDEMIA, UNSPECIFIED: ICD-10-CM

## 2018-08-14 DIAGNOSIS — R58 HEMORRHAGE, NOT ELSEWHERE CLASSIFIED: ICD-10-CM

## 2018-08-14 DIAGNOSIS — M81.0 AGE-RELATED OSTEOPOROSIS WITHOUT CURRENT PATHOLOGICAL FRACTURE: ICD-10-CM

## 2018-08-14 DIAGNOSIS — I65.23 OCCLUSION AND STENOSIS OF BILATERAL CAROTID ARTERIES: ICD-10-CM

## 2018-08-14 DIAGNOSIS — S02.19XA OTHER FRACTURE OF BASE OF SKULL, INITIAL ENCOUNTER FOR CLOSED FRACTURE: ICD-10-CM

## 2018-08-14 DIAGNOSIS — R55 SYNCOPE AND COLLAPSE: ICD-10-CM
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
## 2018-08-14 DIAGNOSIS — S01.111A LACERATION WITHOUT FOREIGN BODY OF RIGHT EYELID AND PERIOCULAR AREA, INITIAL ENCOUNTER: ICD-10-CM
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
## 2018-08-15 ENCOUNTER — APPOINTMENT (OUTPATIENT)
Dept: NEUROSURGERY | Facility: CLINIC | Age: 68
End: 2018-08-15
Payer: MEDICARE

## 2018-08-15 VITALS
DIASTOLIC BLOOD PRESSURE: 73 MMHG | SYSTOLIC BLOOD PRESSURE: 125 MMHG | HEART RATE: 62 BPM | OXYGEN SATURATION: 98 % | BODY MASS INDEX: 16.66 KG/M2 | RESPIRATION RATE: 16 BRPM | HEIGHT: 65 IN | WEIGHT: 100 LBS

## 2018-08-15 DIAGNOSIS — R51 HEADACHE: ICD-10-CM

## 2018-08-15 PROCEDURE — 99215 OFFICE O/P EST HI 40 MIN: CPT

## 2018-08-30 ENCOUNTER — RX RENEWAL (OUTPATIENT)
Age: 68
End: 2018-08-30

## 2018-11-07 ENCOUNTER — RX RENEWAL (OUTPATIENT)
Age: 68
End: 2018-11-07

## 2018-11-18 LAB
25(OH)D3 SERPL-MCNC: 55.2 NG/ML
ALBUMIN SERPL ELPH-MCNC: 4.8 G/DL
ALP BLD-CCNC: 59 U/L
ALT SERPL-CCNC: 21 U/L
ANION GAP SERPL CALC-SCNC: 13 MMOL/L
AST SERPL-CCNC: 28 U/L
BASOPHILS # BLD AUTO: 0.01 K/UL
BASOPHILS NFR BLD AUTO: 0.2 %
BILIRUB SERPL-MCNC: 0.4 MG/DL
BUN SERPL-MCNC: 19 MG/DL
CALCIUM SERPL-MCNC: 9.8 MG/DL
CHLORIDE SERPL-SCNC: 101 MMOL/L
CHOLEST SERPL-MCNC: 179 MG/DL
CHOLEST/HDLC SERPL: 1.8 RATIO
CO2 SERPL-SCNC: 27 MMOL/L
CREAT SERPL-MCNC: 0.78 MG/DL
EOSINOPHIL # BLD AUTO: 0.12 K/UL
EOSINOPHIL NFR BLD AUTO: 2.5 %
FOLATE SERPL-MCNC: >20 NG/ML
GLUCOSE SERPL-MCNC: 96 MG/DL
HBA1C MFR BLD HPLC: 5.9 %
HCT VFR BLD CALC: 42.3 %
HDLC SERPL-MCNC: 101 MG/DL
HGB BLD-MCNC: 13.7 G/DL
IMM GRANULOCYTES NFR BLD AUTO: 0 %
LDLC SERPL CALC-MCNC: 67 MG/DL
LYMPHOCYTES # BLD AUTO: 1.85 K/UL
LYMPHOCYTES NFR BLD AUTO: 37.8 %
MAN DIFF?: NORMAL
MCHC RBC-ENTMCNC: 31.6 PG
MCHC RBC-ENTMCNC: 32.4 GM/DL
MCV RBC AUTO: 97.7 FL
MONOCYTES # BLD AUTO: 0.36 K/UL
MONOCYTES NFR BLD AUTO: 7.4 %
NEUTROPHILS # BLD AUTO: 2.55 K/UL
NEUTROPHILS NFR BLD AUTO: 52.1 %
PLATELET # BLD AUTO: 230 K/UL
POTASSIUM SERPL-SCNC: 5.9 MMOL/L
PROT SERPL-MCNC: 7.1 G/DL
RBC # BLD: 4.33 M/UL
RBC # FLD: 14.5 %
SODIUM SERPL-SCNC: 141 MMOL/L
TRIGL SERPL-MCNC: 56 MG/DL
TSH SERPL-ACNC: 3.28 UIU/ML
VIT B12 SERPL-MCNC: 1253 PG/ML
WBC # FLD AUTO: 4.89 K/UL

## 2018-12-12 ENCOUNTER — APPOINTMENT (OUTPATIENT)
Dept: INTERNAL MEDICINE | Facility: CLINIC | Age: 68
End: 2018-12-12
Payer: MEDICARE

## 2018-12-12 VITALS
DIASTOLIC BLOOD PRESSURE: 66 MMHG | HEART RATE: 68 BPM | TEMPERATURE: 97.9 F | SYSTOLIC BLOOD PRESSURE: 110 MMHG | WEIGHT: 102 LBS | HEIGHT: 64.5 IN | BODY MASS INDEX: 17.2 KG/M2 | OXYGEN SATURATION: 98 % | RESPIRATION RATE: 16 BRPM

## 2018-12-12 DIAGNOSIS — R00.1 BRADYCARDIA, UNSPECIFIED: ICD-10-CM

## 2018-12-12 PROCEDURE — 99214 OFFICE O/P EST MOD 30 MIN: CPT

## 2018-12-12 PROCEDURE — 93000 ELECTROCARDIOGRAM COMPLETE: CPT

## 2018-12-12 NOTE — PHYSICAL EXAM
[General Appearance - Well Developed] : well developed [Normal Appearance] : normal appearance [Well Groomed] : well groomed [General Appearance - Well Nourished] : well nourished [No Deformities] : no deformities [General Appearance - In No Acute Distress] : no acute distress [Normal Conjunctiva] : the conjunctiva exhibited no abnormalities [] : no respiratory distress [Respiration, Rhythm And Depth] : normal respiratory rhythm and effort [Exaggerated Use Of Accessory Muscles For Inspiration] : no accessory muscle use [Auscultation Breath Sounds / Voice Sounds] : lungs were clear to auscultation bilaterally [Heart Sounds] : normal S1 and S2 [Abnormal Walk] : normal gait [FreeTextEntry1] : no edema [Skin Turgor] : normal skin turgor [Oriented To Time, Place, And Person] : oriented to person, place, and time [Affect] : the affect was normal [Mood] : the mood was normal [No Anxiety] : not feeling anxious

## 2018-12-12 NOTE — HISTORY OF PRESENT ILLNESS
[FreeTextEntry1] : 68 year female who had a subdural hematoma after a mechanical fall on August 8, 2018 with LOC and maxillary fractures. She stopped ASA and saw Neurosurgery in followup. She was on Keppra which was then discontinued. She got Tdap. She had orbital fractures and fractured teeth. She has gone to her DDS since then. She had a normal Carotid Doppler as well as an Echocardiogram. She has not had any headaches. He memory has returned.

## 2019-02-07 ENCOUNTER — MEDICATION RENEWAL (OUTPATIENT)
Age: 69
End: 2019-02-07

## 2019-04-29 ENCOUNTER — APPOINTMENT (OUTPATIENT)
Dept: ENDOCRINOLOGY | Facility: CLINIC | Age: 69
End: 2019-04-29
Payer: MEDICARE

## 2019-04-29 ENCOUNTER — RX RENEWAL (OUTPATIENT)
Age: 69
End: 2019-04-29

## 2019-04-29 VITALS — WEIGHT: 103 LBS | BODY MASS INDEX: 17.37 KG/M2 | HEIGHT: 64.76 IN

## 2019-04-29 VITALS
HEART RATE: 66 BPM | WEIGHT: 102 LBS | BODY MASS INDEX: 17.24 KG/M2 | SYSTOLIC BLOOD PRESSURE: 98 MMHG | DIASTOLIC BLOOD PRESSURE: 65 MMHG

## 2019-04-29 PROCEDURE — 99214 OFFICE O/P EST MOD 30 MIN: CPT

## 2019-04-29 NOTE — DATA REVIEWED
[FreeTextEntry1] : 4/19: A1c 6.1%, Cr 0.74, 25D 55.7, \par 11/18: a1c 5.9%\par 1/18: A1c 5.6%, K 5.2, urine  CTX 20, 25D 65\par 6/17: urine alon 0.3 mcg/24 hr. renin 8.4, \par 3/17: A1c 6.1%, tot chol 175, trig 49, , LDL 61, alon 29.5\par 11/16: A1c 5.9%, tot chol 174, trig 42, , LDL 53, 25D 58.8\par \par bone density, 6/17:\par L1-L3, 0.785, T -2.1\par L hip 0.828, T -0.9\par fem neck 0.706, T -1.3\par \par bone density, Hologic, 6/15:\par L1-L3 0.755, T -2.4 (-4.8% from 2013)\par L tot hip 0.834, T -0.9 (-2.0% from 2013)\par L fem neck 0.714, T -1.2

## 2019-04-29 NOTE — PHYSICAL EXAM
[Alert] : alert [Healthy Appearance] : healthy appearance [Normal Voice/Communication] : normal voice communication [No Proptosis] : no proptosis [No Lid Lag] : no lid lag [Normal Hearing] : hearing was normal [Thyroid Not Enlarged] : the thyroid was not enlarged [No Thyroid Nodules] : there were no palpable thyroid nodules [Clear to Auscultation] : lungs were clear to auscultation bilaterally [Normal S1, S2] : normal S1 and S2 [Regular Rhythm] : with a regular rhythm [No Spinal Tenderness] : no spinal tenderness [Normal Affect] : the affect was normal [Normal Mood] : the mood was normal [Kyphosis] : no kyphosis present [Scoliosis] : scoliosis not present [Acanthosis Nigricans] : no acanthosis nigricans

## 2019-04-29 NOTE — ASSESSMENT
[FreeTextEntry1] : Osteoporosis. s/p fall with some facial fractures but not major bones fractured.  will get bone density after end of June,  and if improved or stable, will give drug holiday. \par \par Prediabetes, A1c increased to 6.1% (after she had improved and kept A1c below 6.0 for past year).\par Increase in A1c likely due to reduced physical activity and being not as careful with diet.  She doesn't want to start medication, says she will be able to control her diet better.\par RTO 1 year

## 2019-04-29 NOTE — HISTORY OF PRESENT ILLNESS
[FreeTextEntry1] : fell last August, not sure how (maybe uneven sidewalk), sustained subdural hematoma, slight concussion and some facial fractures, broke a tooth. did not fracture wrist or hip or leg.\par neurologic and cardiac causes of fall ruled out, was told she had a "mechanical" fall.\rashard was found to have low K while hospitalized, which surprised her because she is prone to hyperkalemia and was trying to keep a lot K diet, which she says is "pretty horrible"\rashard having R hip pain for past 8 months, diagnosed with trochanteric bursitis and went for PT which helped for a while.  But then aggravated hip in January, pulled a hamstring, was told she has tendinosis.  Now has two ortho consults, one to do at [facility] and one at [facility].\rashard has been taking alendronate every week, with no side effects.\par \par PMH: Osteoporosis. no fractures.  alendronate use 8/98 - 9/06, 7/15 to present. menopause age 53, after HRT\par prediabetes, A1c 6.1  (2017)\par prone to hyperkalemia. \par \par Meds:\par Fosomax 70mg weekly, restarted 7/15\par simvastatin 40mg\par Premarin half tablet BIW\par aspirin 81mg\par calcium 600mg\par vitamin D 800/day

## 2019-05-01 ENCOUNTER — MEDICATION RENEWAL (OUTPATIENT)
Age: 69
End: 2019-05-01

## 2019-05-07 ENCOUNTER — APPOINTMENT (OUTPATIENT)
Dept: ORTHOPEDIC SURGERY | Facility: CLINIC | Age: 69
End: 2019-05-07
Payer: MEDICARE

## 2019-05-07 VITALS — HEART RATE: 76 BPM | SYSTOLIC BLOOD PRESSURE: 106 MMHG | DIASTOLIC BLOOD PRESSURE: 62 MMHG | OXYGEN SATURATION: 96 %

## 2019-05-07 VITALS — BODY MASS INDEX: 17.58 KG/M2 | WEIGHT: 103 LBS | HEIGHT: 64 IN

## 2019-05-07 DIAGNOSIS — M16.11 UNILATERAL PRIMARY OSTEOARTHRITIS, RIGHT HIP: ICD-10-CM

## 2019-05-07 DIAGNOSIS — N39.0 URINARY TRACT INFECTION, SITE NOT SPECIFIED: ICD-10-CM

## 2019-05-07 PROCEDURE — 99203 OFFICE O/P NEW LOW 30 MIN: CPT

## 2019-05-09 NOTE — ADDENDUM
[FreeTextEntry1] : This note was written by Rika Lombardo on 05/07/2019 acting as scribe for Dr. Benson and JOAQUIN Pitts.

## 2019-05-09 NOTE — DISCUSSION/SUMMARY
[de-identified] : Ms. YANG has progressively severe hip pain and disability secondary to DJD and has failed extensive conservative care including activity modification, analgesic medications and therapeutic exercise. The patient was indicated for right total hip replacement.\par \par We discussed the details of the procedure, the expected recovery period, and the expected outcome. Bearing surface and fixation options were discussed, along with surgical approaches. For this patient I recommended a direct anterior approach.\par \par We discussed the likelihood of satisfaction after complete recovery, and the potential causes of dissatisfaction. Specific risks of total hip replacement were discussed in detail. We discussed the risk of surgical site complications including but not limited to: surgical site infection, wound healing complications, bone fracture, prosthetic hip dislocation, neurovascular injury, hemorrhage, limb length inequality, persistent pain and need for reoperation. We discussed surgical blood loss and the possible need for blood transfusion. We discussed the risk of perioperative medical complications, including but not limited to catheter-associated urinary tract infection, venous thromboembolism and other cardiopulmonary complications. We discussed anesthetic options and the risk of anesthesia-related complications. We discussed the durability of prosthetic hips and limitations related to wear, osteolysis and loosening. The patient was given a copy of my preoperative packet with additional information about the procedure.\par \par The patient gave informed consent for the surgical procedure and was instructed to speak to my surgical coordinator to arrange the logistics of surgical scheduling, presurgical testing, and medical optimization and clearance.\par \par

## 2019-05-09 NOTE — END OF VISIT
[FreeTextEntry3] : All medical record entries made by the Scribe were at my, Dr. Benson's, discretion and personally dictated by me on 05/07/2019. I have reviewed the chart and agree that the record accurately reflects my personal performance of the history, physical exam, assessment and plan. I have also personally directed, reviewed and agreed to the chart.

## 2019-05-09 NOTE — HISTORY OF PRESENT ILLNESS
[de-identified] : 68 year old female presents today for initial evaluation of right hip area pain that began 25 years ago but has become more painful in the past 8 months. She reports severe right hip area pain that's limiting her ability to walk long distances and exercise without pain. She reports that she used to do yoga and Pilates but she no longer can due to pain. Patient reports that acetaminophen helps a little to relieve pain and naproxen does not help and upsets her stomach. She has tried physical therapy and reports that it helped to relieve pain when she started but it is now too painful. She takes Tylenol for pain.\par Of note, patient has a h/o mitral valve prolapse.

## 2019-05-09 NOTE — PHYSICAL EXAM
[de-identified] : Constitutional: Well appearing. No acute distress.\par Mental Status: Alert & oriented to person, place and time. Normal affect.\par Pulmonary: No respiratory distress. Normal chest excursion.\par \par Gait: Right coxalgic.\par Ambulatory assist devices: None.\par \par Cervical spine: Skin intact. No visible deformity. Painless active ROM without evident restriction.\par Bilateral upper extremities: Skin intact. No deformity. Painless active ROM without evident restriction.\par Thoracolumbar spine: No deformity. No tenderness. No radicular pain on passive straight leg raise bilaterally.\par \par Pelvis: No pelvic obliquity. No tenderness.\par Leg lengths: Equal.\par \par Right Hip:\par Skin intact.\par No surgical scars.\par No erythema.\par No ecchymosis.\par No swelling.\par No deformity.\par No focal tenderness.\par Painful ROM from full extension to  degrees of flexion, pelvis moves with further flexion. <5 degrees of internal rotation, pelvis moves with further rotation. 25-30 degrees of external rotation, pelvis moves with further rotation. 30 degrees of abduction. <5 degrees of adduction.\par No crepitation.\par No instability.\par (+) KATJA painful.\par (+) Impingement (FADIR) painful.\par (+) Stinchfield painful.\par Abductor power 5/5.\par Flexor power 4/5.\par \par Left Hip:\par Skin intact.\par No surgical scars.\par No erythema.\par No ecchymosis.\par No swelling.\par No deformity.\par No focal tenderness.\par Painless and unrestricted range of motion.\par No crepitation.\par No instability.\par KATJA painless.\par Impingement (FADIR) painless.\par Stinchfield painless.\par \par Bilateral Knees: Skin intact. No surgical scars. No erythema or ecchymosis. No swelling or effusion. No deformity. No focal tenderness. Painless and unrestricted range of motion. Central patellar tracking. No crepitation. No instability. \par \par Neurological: Intact distal crude touch sensation. Normal distal motor power.\par Cardiovascular: Palpable dorsalis pedis and posterior tibialis pulses. Brisk capillary refill. No peripheral edema.\par Lymphatics: No peripheral adenopathy appreciated. [de-identified] : Outside imaging of the bilateral hips demonstrated right hip with severe joint space narrowing, subchondral sclerosis, cystic changes and osteophyte formation, left hip with minimal degenerative changes

## 2019-07-02 ENCOUNTER — RESULT REVIEW (OUTPATIENT)
Age: 69
End: 2019-07-02

## 2019-07-07 ENCOUNTER — FORM ENCOUNTER (OUTPATIENT)
Age: 69
End: 2019-07-07

## 2019-07-08 ENCOUNTER — OUTPATIENT (OUTPATIENT)
Dept: OUTPATIENT SERVICES | Facility: HOSPITAL | Age: 69
LOS: 1 days | End: 2019-07-08
Payer: COMMERCIAL

## 2019-07-08 ENCOUNTER — OUTPATIENT (OUTPATIENT)
Dept: OUTPATIENT SERVICES | Facility: HOSPITAL | Age: 69
LOS: 1 days | End: 2019-07-08
Payer: MEDICARE

## 2019-07-08 DIAGNOSIS — Z01.818 ENCOUNTER FOR OTHER PREPROCEDURAL EXAMINATION: ICD-10-CM

## 2019-07-08 DIAGNOSIS — Z22.321 CARRIER OR SUSPECTED CARRIER OF METHICILLIN SUSCEPTIBLE STAPHYLOCOCCUS AUREUS: ICD-10-CM

## 2019-07-08 LAB
ANION GAP SERPL CALC-SCNC: 10 MMOL/L — SIGNIFICANT CHANGE UP (ref 5–17)
APPEARANCE UR: CLEAR — SIGNIFICANT CHANGE UP
APTT BLD: 33.5 SEC — SIGNIFICANT CHANGE UP (ref 27.5–36.3)
BILIRUB UR-MCNC: NEGATIVE — SIGNIFICANT CHANGE UP
BUN SERPL-MCNC: 20 MG/DL — SIGNIFICANT CHANGE UP (ref 7–23)
CALCIUM SERPL-MCNC: 10.3 MG/DL — SIGNIFICANT CHANGE UP (ref 8.4–10.5)
CHLORIDE SERPL-SCNC: 102 MMOL/L — SIGNIFICANT CHANGE UP (ref 96–108)
CO2 SERPL-SCNC: 32 MMOL/L — HIGH (ref 22–31)
COLOR SPEC: YELLOW — SIGNIFICANT CHANGE UP
CREAT SERPL-MCNC: 0.79 MG/DL — SIGNIFICANT CHANGE UP (ref 0.5–1.3)
DIFF PNL FLD: ABNORMAL
EPI CELLS # UR: SIGNIFICANT CHANGE UP /HPF (ref 0–5)
GLUCOSE SERPL-MCNC: 87 MG/DL — SIGNIFICANT CHANGE UP (ref 70–99)
GLUCOSE UR QL: NEGATIVE — SIGNIFICANT CHANGE UP
HBA1C BLD-MCNC: 5.8 % — HIGH (ref 4–5.6)
HCT VFR BLD CALC: 43.5 % — SIGNIFICANT CHANGE UP (ref 34.5–45)
HGB BLD-MCNC: 13.5 G/DL — SIGNIFICANT CHANGE UP (ref 11.5–15.5)
INR BLD: 1.02 — SIGNIFICANT CHANGE UP (ref 0.88–1.16)
KETONES UR-MCNC: NEGATIVE — SIGNIFICANT CHANGE UP
LEUKOCYTE ESTERASE UR-ACNC: NEGATIVE — SIGNIFICANT CHANGE UP
MCHC RBC-ENTMCNC: 30.7 PG — SIGNIFICANT CHANGE UP (ref 27–34)
MCHC RBC-ENTMCNC: 31 GM/DL — LOW (ref 32–36)
MCV RBC AUTO: 98.9 FL — SIGNIFICANT CHANGE UP (ref 80–100)
NITRITE UR-MCNC: NEGATIVE — SIGNIFICANT CHANGE UP
NRBC # BLD: 0 /100 WBCS — SIGNIFICANT CHANGE UP (ref 0–0)
PH UR: 5.5 — SIGNIFICANT CHANGE UP (ref 5–8)
PLATELET # BLD AUTO: 240 K/UL — SIGNIFICANT CHANGE UP (ref 150–400)
POTASSIUM SERPL-MCNC: 4.9 MMOL/L — SIGNIFICANT CHANGE UP (ref 3.5–5.3)
POTASSIUM SERPL-SCNC: 4.9 MMOL/L — SIGNIFICANT CHANGE UP (ref 3.5–5.3)
PROT UR-MCNC: NEGATIVE MG/DL — SIGNIFICANT CHANGE UP
PROTHROM AB SERPL-ACNC: 11.5 SEC — SIGNIFICANT CHANGE UP (ref 10–12.9)
RBC # BLD: 4.4 M/UL — SIGNIFICANT CHANGE UP (ref 3.8–5.2)
RBC # FLD: 13.2 % — SIGNIFICANT CHANGE UP (ref 10.3–14.5)
RBC CASTS # UR COMP ASSIST: < 5 /HPF — SIGNIFICANT CHANGE UP
SODIUM SERPL-SCNC: 144 MMOL/L — SIGNIFICANT CHANGE UP (ref 135–145)
SP GR SPEC: 1.01 — SIGNIFICANT CHANGE UP (ref 1–1.03)
UROBILINOGEN FLD QL: 0.2 E.U./DL — SIGNIFICANT CHANGE UP
WBC # BLD: 8.41 K/UL — SIGNIFICANT CHANGE UP (ref 3.8–10.5)
WBC # FLD AUTO: 8.41 K/UL — SIGNIFICANT CHANGE UP (ref 3.8–10.5)
WBC UR QL: < 5 /HPF — SIGNIFICANT CHANGE UP

## 2019-07-08 PROCEDURE — 73502 X-RAY EXAM HIP UNI 2-3 VIEWS: CPT

## 2019-07-08 PROCEDURE — 71046 X-RAY EXAM CHEST 2 VIEWS: CPT | Mod: 26

## 2019-07-08 PROCEDURE — 85610 PROTHROMBIN TIME: CPT

## 2019-07-08 PROCEDURE — 93005 ELECTROCARDIOGRAM TRACING: CPT

## 2019-07-08 PROCEDURE — 81001 URINALYSIS AUTO W/SCOPE: CPT

## 2019-07-08 PROCEDURE — 83036 HEMOGLOBIN GLYCOSYLATED A1C: CPT

## 2019-07-08 PROCEDURE — 71046 X-RAY EXAM CHEST 2 VIEWS: CPT

## 2019-07-08 PROCEDURE — 85730 THROMBOPLASTIN TIME PARTIAL: CPT

## 2019-07-08 PROCEDURE — 93010 ELECTROCARDIOGRAM REPORT: CPT

## 2019-07-08 PROCEDURE — 87641 MR-STAPH DNA AMP PROBE: CPT

## 2019-07-08 PROCEDURE — 80048 BASIC METABOLIC PNL TOTAL CA: CPT

## 2019-07-08 PROCEDURE — 73502 X-RAY EXAM HIP UNI 2-3 VIEWS: CPT | Mod: 26,RT

## 2019-07-08 PROCEDURE — 87086 URINE CULTURE/COLONY COUNT: CPT

## 2019-07-08 PROCEDURE — 85027 COMPLETE CBC AUTOMATED: CPT

## 2019-07-09 LAB
CULTURE RESULTS: NO GROWTH — SIGNIFICANT CHANGE UP
MRSA PCR RESULT.: NEGATIVE — SIGNIFICANT CHANGE UP
S AUREUS DNA NOSE QL NAA+PROBE: POSITIVE
SPECIMEN SOURCE: SIGNIFICANT CHANGE UP

## 2019-07-11 LAB
ANABASINE UR-MCNC: <2 NG/ML — SIGNIFICANT CHANGE UP
COTININE UR-MCNC: <5 NG/ML — SIGNIFICANT CHANGE UP
NICOTINE UR-MCNC: <5 NG/ML — SIGNIFICANT CHANGE UP
NORNICOTINE UR-MCNC: <2 NG/ML — SIGNIFICANT CHANGE UP

## 2019-07-16 ENCOUNTER — APPOINTMENT (OUTPATIENT)
Dept: HEART AND VASCULAR | Facility: CLINIC | Age: 69
End: 2019-07-16
Payer: MEDICARE

## 2019-07-16 VITALS
HEART RATE: 59 BPM | RESPIRATION RATE: 16 BRPM | DIASTOLIC BLOOD PRESSURE: 80 MMHG | OXYGEN SATURATION: 98 % | WEIGHT: 103.03 LBS | BODY MASS INDEX: 16.96 KG/M2 | SYSTOLIC BLOOD PRESSURE: 124 MMHG | TEMPERATURE: 98 F | HEIGHT: 65.5 IN

## 2019-07-16 DIAGNOSIS — Z87.2 PERSONAL HISTORY OF DISEASES OF THE SKIN AND SUBCUTANEOUS TISSUE: ICD-10-CM

## 2019-07-16 DIAGNOSIS — Z86.39 PERSONAL HISTORY OF OTHER ENDOCRINE, NUTRITIONAL AND METABOLIC DISEASE: ICD-10-CM

## 2019-07-16 DIAGNOSIS — Z87.09 PERSONAL HISTORY OF OTHER DISEASES OF THE RESPIRATORY SYSTEM: ICD-10-CM

## 2019-07-16 DIAGNOSIS — Z91.81 HISTORY OF FALLING: ICD-10-CM

## 2019-07-16 DIAGNOSIS — Z86.19 PERSONAL HISTORY OF OTHER INFECTIOUS AND PARASITIC DISEASES: ICD-10-CM

## 2019-07-16 DIAGNOSIS — R07.89 OTHER CHEST PAIN: ICD-10-CM

## 2019-07-16 DIAGNOSIS — S06.5X9A TRAUMATIC SUBDURAL HEMORRHAGE WITH LOSS OF CONSCIOUSNESS OF UNSPECIFIED DURATION, INITIAL ENCOUNTER: ICD-10-CM

## 2019-07-16 DIAGNOSIS — Z87.898 PERSONAL HISTORY OF OTHER SPECIFIED CONDITIONS: ICD-10-CM

## 2019-07-16 PROCEDURE — 99214 OFFICE O/P EST MOD 30 MIN: CPT

## 2019-07-16 PROCEDURE — 93000 ELECTROCARDIOGRAM COMPLETE: CPT | Mod: NC

## 2019-07-16 NOTE — DISCUSSION/SUMMARY
[FreeTextEntry1] : I informed the patient that there is no medical or cardiovascular contraindication to THR at this time. She will discuss preop skin prep with her Orthopedic team and Dermatologist

## 2019-07-16 NOTE — PHYSICAL EXAM
[General Appearance - Well Developed] : well developed [Normal Appearance] : normal appearance [Well Groomed] : well groomed [General Appearance - Well Nourished] : well nourished [No Deformities] : no deformities [General Appearance - In No Acute Distress] : no acute distress [Normal Conjunctiva] : the conjunctiva exhibited no abnormalities [Normal Oral Mucosa] : normal oral mucosa [No Oral Pallor] : no oral pallor [No Oral Cyanosis] : no oral cyanosis [Respiration, Rhythm And Depth] : normal respiratory rhythm and effort [Exaggerated Use Of Accessory Muscles For Inspiration] : no accessory muscle use [Auscultation Breath Sounds / Voice Sounds] : lungs were clear to auscultation bilaterally [Heart Rate And Rhythm] : heart rate and rhythm were normal [Heart Sounds] : normal S1 and S2 [Abdomen Soft] : soft [Abdomen Tenderness] : non-tender [] : no hepato-splenomegaly [Abdomen Mass (___ Cm)] : no abdominal mass palpated [Skin Turgor] : normal skin turgor [Oriented To Time, Place, And Person] : oriented to person, place, and time [Affect] : the affect was normal [Mood] : the mood was normal [No Anxiety] : not feeling anxious [FreeTextEntry1] : no edema

## 2019-07-30 VITALS
RESPIRATION RATE: 20 BRPM | TEMPERATURE: 98 F | SYSTOLIC BLOOD PRESSURE: 130 MMHG | HEART RATE: 84 BPM | OXYGEN SATURATION: 99 % | WEIGHT: 102.96 LBS | DIASTOLIC BLOOD PRESSURE: 72 MMHG | HEIGHT: 64 IN

## 2019-07-30 RX ORDER — SIMVASTATIN 20 MG/1
0 TABLET, FILM COATED ORAL
Qty: 0 | Refills: 0 | DISCHARGE

## 2019-07-30 RX ADMIN — Medication 15 MILLIGRAM(S): at 23:58

## 2019-07-30 NOTE — H&P ADULT - PROBLEM SELECTOR PLAN 1
Admit to Orthopaedic Service.  Presents today for elective RIGHT THR.   Pt medically stable and cleared for procedure today by Dr. Tellez

## 2019-07-30 NOTE — H&P ADULT - NSHPPHYSICALEXAM_GEN_ALL_CORE
GENERAL:  PE:  Decreased ROM secondary to pain. Rest of PE per medical clearance. MSK: Decreased ROM secondary to pain, right hip.   Skin warm and well perfused, no visible wounds/erythema/ecchymoses  EHL/FHL/TA/GS 5/5 motor strength bilaterally   SLT in tact and equal to distal bilateral lower extremities   DP/PT pulses 2+   Remainder of PE per medical clearance.

## 2019-07-30 NOTE — H&P ADULT - HISTORY OF PRESENT ILLNESS
69yo f c/o right hip pain x   Presents today for elective RIGHT THR. 68F c/o right hip pain x chronic. Pt denies preceding trauma/injury. Pt states her hip pain radiates to her right knee and low back. She takes Tylenol as needed for pain. He denies numbness/tingling/weakness of bilateral lower extremities. Pt does not ambulate with an assistive device at baseline. Denies DVT hx.  Pt has participated in PT and has failed conservative treatment for her symptoms.     Presents today for elective right total hip replacement

## 2019-07-30 NOTE — H&P ADULT - NSICDXPASTMEDICALHX_GEN_ALL_CORE_FT
PAST MEDICAL HISTORY:  Carotid atherosclerosis, bilateral     Eczema     Fall     HSV (herpes simplex virus) infection     Hyperlipidemia     Mitral regurgitation     Osteoporosis     Polio

## 2019-07-30 NOTE — H&P ADULT - NSHPLABSRESULTS_GEN_ALL_CORE
preop cbc/bmp/coags/ua wnl per medical clearance  Preop EKG sinus bradycardia  with sinus arrhythmia   Preop chest x-ray wnl per clearance   nares + MSSA, used bactroban per med clearance

## 2019-07-30 NOTE — PATIENT PROFILE ADULT - VISION (WITH CORRECTIVE LENSES IF THE PATIENT USUALLY WEARS THEM):
Normal vision: sees adequately in most situations; can see medication labels, newsprint distance glass/Partially impaired: cannot see medication labels or newsprint, but can see obstacles in path, and the surrounding layout; can count fingers at arm's length

## 2019-07-31 ENCOUNTER — MEDICATION RENEWAL (OUTPATIENT)
Age: 69
End: 2019-07-31

## 2019-07-31 ENCOUNTER — RESULT REVIEW (OUTPATIENT)
Age: 69
End: 2019-07-31

## 2019-07-31 ENCOUNTER — INPATIENT (INPATIENT)
Facility: HOSPITAL | Age: 69
LOS: 0 days | Discharge: HOME HEALTH | DRG: 470 | End: 2019-08-01
Attending: ORTHOPAEDIC SURGERY | Admitting: ORTHOPAEDIC SURGERY
Payer: MEDICARE

## 2019-07-31 ENCOUNTER — APPOINTMENT (OUTPATIENT)
Dept: ORTHOPEDIC SURGERY | Facility: HOSPITAL | Age: 69
End: 2019-07-31

## 2019-07-31 DIAGNOSIS — E78.5 HYPERLIPIDEMIA, UNSPECIFIED: ICD-10-CM

## 2019-07-31 DIAGNOSIS — Z41.9 ENCOUNTER FOR PROCEDURE FOR PURPOSES OTHER THAN REMEDYING HEALTH STATE, UNSPECIFIED: Chronic | ICD-10-CM

## 2019-07-31 DIAGNOSIS — L30.9 DERMATITIS, UNSPECIFIED: ICD-10-CM

## 2019-07-31 DIAGNOSIS — Z98.890 OTHER SPECIFIED POSTPROCEDURAL STATES: Chronic | ICD-10-CM

## 2019-07-31 DIAGNOSIS — M19.90 UNSPECIFIED OSTEOARTHRITIS, UNSPECIFIED SITE: ICD-10-CM

## 2019-07-31 DIAGNOSIS — B00.9 HERPESVIRAL INFECTION, UNSPECIFIED: ICD-10-CM

## 2019-07-31 PROCEDURE — 72170 X-RAY EXAM OF PELVIS: CPT | Mod: 26

## 2019-07-31 PROCEDURE — 27130 TOTAL HIP ARTHROPLASTY: CPT | Mod: RT

## 2019-07-31 RX ORDER — SODIUM CHLORIDE 9 MG/ML
1000 INJECTION, SOLUTION INTRAVENOUS
Refills: 0 | Status: DISCONTINUED | OUTPATIENT
Start: 2019-07-31 | End: 2019-08-01

## 2019-07-31 RX ORDER — CEFAZOLIN SODIUM 1 G
2000 VIAL (EA) INJECTION EVERY 8 HOURS
Refills: 0 | Status: COMPLETED | OUTPATIENT
Start: 2019-07-31 | End: 2019-08-01

## 2019-07-31 RX ORDER — OXYCODONE HYDROCHLORIDE 5 MG/1
10 TABLET ORAL EVERY 4 HOURS
Refills: 0 | Status: DISCONTINUED | OUTPATIENT
Start: 2019-07-31 | End: 2019-08-01

## 2019-07-31 RX ORDER — CEFAZOLIN SODIUM 1 G
2000 VIAL (EA) INJECTION EVERY 8 HOURS
Refills: 0 | Status: DISCONTINUED | OUTPATIENT
Start: 2019-07-31 | End: 2019-07-31

## 2019-07-31 RX ORDER — OXYCODONE HYDROCHLORIDE 5 MG/1
5 TABLET ORAL EVERY 4 HOURS
Refills: 0 | Status: DISCONTINUED | OUTPATIENT
Start: 2019-07-31 | End: 2019-08-01

## 2019-07-31 RX ORDER — SENNA PLUS 8.6 MG/1
2 TABLET ORAL AT BEDTIME
Refills: 0 | Status: DISCONTINUED | OUTPATIENT
Start: 2019-07-31 | End: 2019-08-01

## 2019-07-31 RX ORDER — FERROUS SULFATE 325(65) MG
0 TABLET ORAL
Qty: 0 | Refills: 0 | DISCHARGE

## 2019-07-31 RX ORDER — ACETAMINOPHEN 500 MG
650 TABLET ORAL EVERY 6 HOURS
Refills: 0 | Status: DISCONTINUED | OUTPATIENT
Start: 2019-07-31 | End: 2019-08-01

## 2019-07-31 RX ORDER — PANTOPRAZOLE SODIUM 20 MG/1
40 TABLET, DELAYED RELEASE ORAL
Refills: 0 | Status: DISCONTINUED | OUTPATIENT
Start: 2019-07-31 | End: 2019-08-01

## 2019-07-31 RX ORDER — ONDANSETRON 8 MG/1
4 TABLET, FILM COATED ORAL EVERY 6 HOURS
Refills: 0 | Status: DISCONTINUED | OUTPATIENT
Start: 2019-07-31 | End: 2019-08-01

## 2019-07-31 RX ORDER — MORPHINE SULFATE 50 MG/1
4 CAPSULE, EXTENDED RELEASE ORAL EVERY 4 HOURS
Refills: 0 | Status: DISCONTINUED | OUTPATIENT
Start: 2019-07-31 | End: 2019-08-01

## 2019-07-31 RX ORDER — APREPITANT 80 MG/1
40 CAPSULE ORAL ONCE
Refills: 0 | Status: COMPLETED | OUTPATIENT
Start: 2019-07-31 | End: 2019-07-31

## 2019-07-31 RX ORDER — POLYETHYLENE GLYCOL 3350 17 G/17G
17 POWDER, FOR SOLUTION ORAL DAILY
Refills: 0 | Status: DISCONTINUED | OUTPATIENT
Start: 2019-07-31 | End: 2019-08-01

## 2019-07-31 RX ORDER — CHOLECALCIFEROL (VITAMIN D3) 125 MCG
1 CAPSULE ORAL
Qty: 0 | Refills: 0 | DISCHARGE

## 2019-07-31 RX ORDER — MAGNESIUM HYDROXIDE 400 MG/1
30 TABLET, CHEWABLE ORAL DAILY
Refills: 0 | Status: DISCONTINUED | OUTPATIENT
Start: 2019-07-31 | End: 2019-08-01

## 2019-07-31 RX ORDER — ACETAMINOPHEN 500 MG
1000 TABLET ORAL ONCE
Refills: 0 | Status: COMPLETED | OUTPATIENT
Start: 2019-07-31 | End: 2019-07-31

## 2019-07-31 RX ORDER — BUPIVACAINE 13.3 MG/ML
20 INJECTION, SUSPENSION, LIPOSOMAL INFILTRATION ONCE
Refills: 0 | Status: DISCONTINUED | OUTPATIENT
Start: 2019-07-31 | End: 2019-08-01

## 2019-07-31 RX ORDER — KETOROLAC TROMETHAMINE 30 MG/ML
15 SYRINGE (ML) INJECTION EVERY 6 HOURS
Refills: 0 | Status: COMPLETED | OUTPATIENT
Start: 2019-07-31 | End: 2019-08-01

## 2019-07-31 RX ORDER — ASPIRIN/CALCIUM CARB/MAGNESIUM 324 MG
325 TABLET ORAL
Refills: 0 | Status: DISCONTINUED | OUTPATIENT
Start: 2019-07-31 | End: 2019-08-01

## 2019-07-31 RX ORDER — MORPHINE SULFATE 50 MG/1
4 CAPSULE, EXTENDED RELEASE ORAL
Refills: 0 | Status: DISCONTINUED | OUTPATIENT
Start: 2019-07-31 | End: 2019-08-01

## 2019-07-31 RX ORDER — SIMVASTATIN 20 MG/1
1 TABLET, FILM COATED ORAL
Qty: 0 | Refills: 0 | DISCHARGE

## 2019-07-31 RX ORDER — DOCUSATE SODIUM 100 MG
100 CAPSULE ORAL THREE TIMES A DAY
Refills: 0 | Status: DISCONTINUED | OUTPATIENT
Start: 2019-07-31 | End: 2019-08-01

## 2019-07-31 RX ORDER — SIMVASTATIN 20 MG/1
40 TABLET, FILM COATED ORAL AT BEDTIME
Refills: 0 | Status: DISCONTINUED | OUTPATIENT
Start: 2019-07-31 | End: 2019-08-01

## 2019-07-31 RX ADMIN — Medication 100 MILLIGRAM(S): at 21:44

## 2019-07-31 RX ADMIN — Medication 1000 MILLIGRAM(S): at 06:20

## 2019-07-31 RX ADMIN — Medication 650 MILLIGRAM(S): at 13:04

## 2019-07-31 RX ADMIN — Medication 650 MILLIGRAM(S): at 19:08

## 2019-07-31 RX ADMIN — Medication 15 MILLIGRAM(S): at 23:42

## 2019-07-31 RX ADMIN — Medication 325 MILLIGRAM(S): at 18:08

## 2019-07-31 RX ADMIN — Medication 15 MILLIGRAM(S): at 13:04

## 2019-07-31 RX ADMIN — Medication 650 MILLIGRAM(S): at 12:49

## 2019-07-31 RX ADMIN — Medication 15 MILLIGRAM(S): at 18:08

## 2019-07-31 RX ADMIN — Medication 650 MILLIGRAM(S): at 23:42

## 2019-07-31 RX ADMIN — Medication 2000 MILLIGRAM(S): at 18:08

## 2019-07-31 RX ADMIN — Medication 15 MILLIGRAM(S): at 18:23

## 2019-07-31 RX ADMIN — APREPITANT 40 MILLIGRAM(S): 80 CAPSULE ORAL at 06:20

## 2019-07-31 RX ADMIN — Medication 650 MILLIGRAM(S): at 18:08

## 2019-07-31 RX ADMIN — SIMVASTATIN 40 MILLIGRAM(S): 20 TABLET, FILM COATED ORAL at 21:44

## 2019-07-31 RX ADMIN — SODIUM CHLORIDE 120 MILLILITER(S): 9 INJECTION, SOLUTION INTRAVENOUS at 10:55

## 2019-07-31 RX ADMIN — Medication 15 MILLIGRAM(S): at 12:49

## 2019-07-31 NOTE — PHYSICAL THERAPY INITIAL EVALUATION ADULT - CRITERIA FOR SKILLED THERAPEUTIC INTERVENTIONS
rehab potential/anticipated equipment needs at discharge/predicted duration of therapy intervention/risk reduction/prevention/therapy frequency/anticipated discharge recommendation/impairments found/functional limitations in following categories

## 2019-07-31 NOTE — PHYSICAL THERAPY INITIAL EVALUATION ADULT - MODALITIES TREATMENT COMMENTS
Completed and given handout: Ankle pumps x 20, glute sets 5 sec x10, heel slides x10, quad sets 5 sec x10

## 2019-07-31 NOTE — PHYSICAL THERAPY INITIAL EVALUATION ADULT - ACTIVE RANGE OF MOTION EXAMINATION, REHAB EVAL
loi. upper extremity Active ROM was WNL (within normal limits)/except R hip flexion 0-75/bilateral lower extremity Active ROM was WNL (within normal limits)

## 2019-07-31 NOTE — PHYSICAL THERAPY INITIAL EVALUATION ADULT - GENERAL OBSERVATIONS, REHAB EVAL
Spoke to JANELLE Valdez (covering), pt cleared for PT. POD#0 R anterior CHANDA. Pt rcvd semi supine from PACU, +ice packs, +SCDs, +heplock. Pt agreeable to PT, reports 5/10 pain. Tolerated session fairly well, demo Escobar bed mob, Escobar transfers, amb 75ft CG with RW. Pt left as found, +callbell, needs in reach with RN aware. Demo incr pain, decr ROM, balance, strength, and safety. Fim gait=4.

## 2019-07-31 NOTE — PROGRESS NOTE ADULT - SUBJECTIVE AND OBJECTIVE BOX
Ortho Post Op Check    Procedure: Right anterior THR  Surgeon: Dr. Benson    Pt comfortable without complaints, pain controlled  Denies CP, SOB, N/V, numbness/tingling     Vital Signs Last 24 Hrs  T(C): 35.9 (07-31-19 @ 10:04), Max: 35.9 (07-31-19 @ 10:04)  T(F): 96.6 (07-31-19 @ 10:04), Max: 96.6 (07-31-19 @ 10:04)  HR: 62 (07-31-19 @ 13:04) (62 - 72)  BP: 139/65 (07-31-19 @ 13:04) (115/65 - 159/67)  BP(mean): 93 (07-31-19 @ 13:04) (80 - 97)  RR: 20 (07-31-19 @ 13:04) (11 - 20)  SpO2: 100% (07-31-19 @ 13:04) (94% - 100%)    General: Pt Alert and oriented, NAD  DSG C/D/I right hip  Pulses intact RLE  Sensation intact RLE  Motor: EHL/FHL/TA/GS 5/5 RLE    Post-op X-Ray: prosthesis in place    A/P: 68yFemale POD#0 s/p Right anterior THR  - Stable  - Pain Control  - DVT ppx: asa  - Post op abx: ancef  - PT, WBS: wbat    Ortho Pager [phone]

## 2019-07-31 NOTE — PHYSICAL THERAPY INITIAL EVALUATION ADULT - ADDITIONAL COMMENTS
Pt lives in elevator building with spouse, no stairs. Pt indpt with ADLs/iadls prior to sx with exception of getting groceries. States amb tolerance <3 blocks recent weeks before sx. 1 fall last august, mechanical. Pt does not own devices.

## 2019-08-01 ENCOUNTER — TRANSCRIPTION ENCOUNTER (OUTPATIENT)
Age: 69
End: 2019-08-01

## 2019-08-01 VITALS
OXYGEN SATURATION: 100 % | HEART RATE: 57 BPM | RESPIRATION RATE: 17 BRPM | TEMPERATURE: 98 F | SYSTOLIC BLOOD PRESSURE: 94 MMHG | DIASTOLIC BLOOD PRESSURE: 56 MMHG

## 2019-08-01 LAB
ANION GAP SERPL CALC-SCNC: 9 MMOL/L — SIGNIFICANT CHANGE UP (ref 5–17)
APPEARANCE UR: CLEAR — SIGNIFICANT CHANGE UP
BILIRUB UR-MCNC: NEGATIVE — SIGNIFICANT CHANGE UP
BUN SERPL-MCNC: 13 MG/DL — SIGNIFICANT CHANGE UP (ref 7–23)
CALCIUM SERPL-MCNC: 8.7 MG/DL — SIGNIFICANT CHANGE UP (ref 8.4–10.5)
CHLORIDE SERPL-SCNC: 102 MMOL/L — SIGNIFICANT CHANGE UP (ref 96–108)
CO2 SERPL-SCNC: 28 MMOL/L — SIGNIFICANT CHANGE UP (ref 22–31)
COLOR SPEC: YELLOW — SIGNIFICANT CHANGE UP
CREAT SERPL-MCNC: 0.61 MG/DL — SIGNIFICANT CHANGE UP (ref 0.5–1.3)
DIFF PNL FLD: NEGATIVE — SIGNIFICANT CHANGE UP
GLUCOSE SERPL-MCNC: 139 MG/DL — HIGH (ref 70–99)
GLUCOSE UR QL: NEGATIVE — SIGNIFICANT CHANGE UP
HCT VFR BLD CALC: 35 % — SIGNIFICANT CHANGE UP (ref 34.5–45)
HCV AB S/CO SERPL IA: 0.18 S/CO — SIGNIFICANT CHANGE UP
HCV AB SERPL-IMP: SIGNIFICANT CHANGE UP
HGB BLD-MCNC: 11.2 G/DL — LOW (ref 11.5–15.5)
KETONES UR-MCNC: NEGATIVE — SIGNIFICANT CHANGE UP
LEUKOCYTE ESTERASE UR-ACNC: NEGATIVE — SIGNIFICANT CHANGE UP
MCHC RBC-ENTMCNC: 31.4 PG — SIGNIFICANT CHANGE UP (ref 27–34)
MCHC RBC-ENTMCNC: 32 GM/DL — SIGNIFICANT CHANGE UP (ref 32–36)
MCV RBC AUTO: 98 FL — SIGNIFICANT CHANGE UP (ref 80–100)
NITRITE UR-MCNC: NEGATIVE — SIGNIFICANT CHANGE UP
NRBC # BLD: 0 /100 WBCS — SIGNIFICANT CHANGE UP (ref 0–0)
PH UR: 6 — SIGNIFICANT CHANGE UP (ref 5–8)
PLATELET # BLD AUTO: 162 K/UL — SIGNIFICANT CHANGE UP (ref 150–400)
POTASSIUM SERPL-MCNC: 4 MMOL/L — SIGNIFICANT CHANGE UP (ref 3.5–5.3)
POTASSIUM SERPL-SCNC: 4 MMOL/L — SIGNIFICANT CHANGE UP (ref 3.5–5.3)
PROT UR-MCNC: NEGATIVE MG/DL — SIGNIFICANT CHANGE UP
RBC # BLD: 3.57 M/UL — LOW (ref 3.8–5.2)
RBC # FLD: 13.5 % — SIGNIFICANT CHANGE UP (ref 10.3–14.5)
SODIUM SERPL-SCNC: 139 MMOL/L — SIGNIFICANT CHANGE UP (ref 135–145)
SP GR SPEC: <=1.005 — SIGNIFICANT CHANGE UP (ref 1–1.03)
UROBILINOGEN FLD QL: 0.2 E.U./DL — SIGNIFICANT CHANGE UP
WBC # BLD: 10.48 K/UL — SIGNIFICANT CHANGE UP (ref 3.8–10.5)
WBC # FLD AUTO: 10.48 K/UL — SIGNIFICANT CHANGE UP (ref 3.8–10.5)

## 2019-08-01 PROCEDURE — 99233 SBSQ HOSP IP/OBS HIGH 50: CPT

## 2019-08-01 RX ORDER — POLYETHYLENE GLYCOL 3350 17 G/17G
17 POWDER, FOR SOLUTION ORAL
Qty: 0 | Refills: 0 | DISCHARGE
Start: 2019-08-01

## 2019-08-01 RX ORDER — PANTOPRAZOLE SODIUM 20 MG/1
1 TABLET, DELAYED RELEASE ORAL
Qty: 0 | Refills: 0 | DISCHARGE
Start: 2019-08-01

## 2019-08-01 RX ORDER — ASPIRIN/CALCIUM CARB/MAGNESIUM 324 MG
1 TABLET ORAL
Qty: 0 | Refills: 0 | DISCHARGE
Start: 2019-08-01

## 2019-08-01 RX ORDER — DOCUSATE SODIUM 100 MG
1 CAPSULE ORAL
Qty: 0 | Refills: 0 | DISCHARGE
Start: 2019-08-01

## 2019-08-01 RX ORDER — ACETAMINOPHEN 500 MG
2 TABLET ORAL
Qty: 0 | Refills: 0 | DISCHARGE
Start: 2019-08-01

## 2019-08-01 RX ORDER — SENNA PLUS 8.6 MG/1
2 TABLET ORAL
Qty: 0 | Refills: 0 | DISCHARGE
Start: 2019-08-01

## 2019-08-01 RX ORDER — MELOXICAM 15 MG/1
1 TABLET ORAL
Qty: 0 | Refills: 0 | DISCHARGE

## 2019-08-01 RX ORDER — ACETAMINOPHEN 500 MG
0 TABLET ORAL
Qty: 0 | Refills: 0 | DISCHARGE

## 2019-08-01 RX ADMIN — Medication 650 MILLIGRAM(S): at 11:45

## 2019-08-01 RX ADMIN — Medication 650 MILLIGRAM(S): at 00:07

## 2019-08-01 RX ADMIN — Medication 650 MILLIGRAM(S): at 06:21

## 2019-08-01 RX ADMIN — PANTOPRAZOLE SODIUM 40 MILLIGRAM(S): 20 TABLET, DELAYED RELEASE ORAL at 05:22

## 2019-08-01 RX ADMIN — Medication 15 MILLIGRAM(S): at 11:50

## 2019-08-01 RX ADMIN — Medication 15 MILLIGRAM(S): at 05:22

## 2019-08-01 RX ADMIN — Medication 650 MILLIGRAM(S): at 11:15

## 2019-08-01 RX ADMIN — Medication 15 MILLIGRAM(S): at 05:37

## 2019-08-01 RX ADMIN — Medication 325 MILLIGRAM(S): at 05:22

## 2019-08-01 RX ADMIN — Medication 650 MILLIGRAM(S): at 05:21

## 2019-08-01 RX ADMIN — Medication 100 MILLIGRAM(S): at 05:22

## 2019-08-01 RX ADMIN — Medication 15 MILLIGRAM(S): at 11:45

## 2019-08-01 RX ADMIN — Medication 2000 MILLIGRAM(S): at 02:33

## 2019-08-01 NOTE — DISCHARGE NOTE NURSING/CASE MANAGEMENT/SOCIAL WORK - NSDCDPATPORTLINK_GEN_ALL_CORE
You can access the [facility] Portal, offered by [facility], by registering with the following website: [email or web address]

## 2019-08-01 NOTE — CONSULT NOTE ADULT - ASSESSMENT
69 yo f with HLD, osteoporosis, MR, who is s/p right THR    1) HLD- continue statin  2) Osteoporosis - no tx at current time, used to be on bisphosphanate  3) DVT ppx with asa bid dosing  4) f/up AM labs

## 2019-08-01 NOTE — CONSULT NOTE ADULT - SUBJECTIVE AND OBJECTIVE BOX
Patient seen and examined, Chart reviewed    HPI:  68F with HLD, Osteoporosis, with c/o right hip pain for years. Pt denies preceding trauma/injury. Pt states her hip pain radiates to her right knee and low back. She takes Tylenol as needed for pain. He denies numbness/tingling/weakness of bilateral lower extremities. Pt does not ambulate with an assistive device at baseline. Denies DVT hx.  Pt has participated in PT and has failed conservative treatment for her symptoms.     She is today POD #1 from an elective right total hip replacement       PAST MEDICAL & SURGICAL HISTORY:  Hyperlipidemia  Polio  Mitral regurgitation  HSV (herpes simplex virus) infection  Osteoporosis  Eczema  Carotid atherosclerosis, bilateral  Surgery, elective: Chin implant  S/P biopsy: breast      REVIEW OF SYSTEMS:  CONSTITUTIONAL:  No night sweats.  No fatigue,  No fever or chills.  HEENT:  Eyes:  No visual changes.   RESPIRATORY:  No cough.  No wheeze.    No shortness of breath.  CARDIOVASCULAR:  No chest pains.  No palpitations.  GASTROINTESTINAL:  No abdominal pain.  No nausea or vomiting.  No diarrhea.    GENITOURINARY:  No urgency.  No frequency.  No dysuria.  No hematuria.    MUSCULOSKELETAL:  minimal right hip pain    SKIN:  No rashes.      acetaminophen   Tablet .. 650 milliGRAM(s) Oral every 6 hours  aluminum hydroxide/magnesium hydroxide/simethicone Suspension 30 milliLiter(s) Oral four times a day PRN  aspirin enteric coated 325 milliGRAM(s) Oral two times a day  BUpivacaine liposome 1.3% Injectable (no eMAR) 20 milliLiter(s) Local Injection once  docusate sodium 100 milliGRAM(s) Oral three times a day  ketorolac   Injectable 15 milliGRAM(s) IV Push every 6 hours  lactated ringers. 1000 milliLiter(s) IV Continuous <Continuous>  magnesium hydroxide Suspension 30 milliLiter(s) Oral daily PRN  morphine  - Injectable 4 milliGRAM(s) IV Push every 15 minutes PRN  morphine  - Injectable 4 milliGRAM(s) IV Push every 4 hours PRN  ondansetron Injectable 4 milliGRAM(s) IV Push every 6 hours PRN  oxyCODONE    IR 5 milliGRAM(s) Oral every 4 hours PRN  oxyCODONE    IR 10 milliGRAM(s) Oral every 4 hours PRN  pantoprazole    Tablet 40 milliGRAM(s) Oral before breakfast  polyethylene glycol 3350 17 Gram(s) Oral daily  senna 2 Tablet(s) Oral at bedtime PRN  simvastatin 40 milliGRAM(s) Oral at bedtime      Allergies    sulfa drugs (Stomach Upset)        SOCIAL HISTORY: no tob    FAMILY HISTORY:nc      Vital Signs Last 24 Hrs  T(C): 36.4 (01 Aug 2019 04:35), Max: 36.7 (31 Jul 2019 15:04)  T(F): 97.6 (01 Aug 2019 04:35), Max: 98.1 (31 Jul 2019 15:04)  HR: 59 (01 Aug 2019 04:35) (58 - 82)  BP: 96/62 (01 Aug 2019 04:35) (96/62 - 159/67)  BP(mean): 90 (31 Jul 2019 15:04) (80 - 97)  RR: 17 (01 Aug 2019 04:35) (11 - 20)  SpO2: 97% (01 Aug 2019 04:35) (94% - 100%)    07-31 @ 07:01  -  08-01 @ 06:31  --------------------------------------------------------  IN: 600 mL / OUT: 2350 mL / NET: -1750 mL        PHYSICAL EXAM:   General - NAD, Alert and oriented x 3,   Eyes - EOM intact  Neck - - No lymphadenopathy  Cardiovascular - RRR  2/6 racquel   Lungs - Clear to ausucltation, no use of accessory muscles, no crackles or wheezes.  Skin - No rashes, skin warm and dry, no erythematous areas  Abdomen - Normal bowel sounds, abdomen soft and nontender  Extremeties - No edema,  Neurological – no focal deficits      LABS:    am labs pending              RADIOLOGY & ADDITIONAL STUDIES:

## 2019-08-01 NOTE — DISCHARGE NOTE PROVIDER - NSDCHHNEEDSERVICE_GEN_ALL_CORE
Rehabilitation services/Other, specify.../Medication teaching and assessment/Wound care and assessment/Observation and assessment/Teaching and training

## 2019-08-01 NOTE — PROGRESS NOTE ADULT - ASSESSMENT
A/P: 68yFemale s/p R CHANDA  - Stable  - Pain/Nausea Control  - Home meds  - AM labs unremarkable  - DVT ppx:  BID  - WBS: WBAT RLE  - PT: home w home PT      Ortho Pager [phone]

## 2019-08-01 NOTE — DISCHARGE NOTE PROVIDER - NSDCFUADDINST_GEN_ALL_CORE_FT
See attached instructions from Dr. Benson  Weight bear as tolerated with assistive device  No strenuous activity, heavy lifting, driving or returning to work until cleared by MD.  You may shower - dressing is water-resistant, no soaking in bathtubs.  Remove dressing after post op day 5-7, then leave incision open to air. Keep incision clean and dry.  Try to have regular bowel movements, take stool softener or laxative if necessary.  May take Pepcid or Zantac for upset stomach.  May take Aleve or Naproxen instead of Meloxicam/Celebrex.  Swelling may travel all the way down leg to foot, this is normal and will subside in a few weeks.  Call to schedule an appt with Dr. Benson for follow up, if you have staples or sutures they will be removed in office.  Contact your doctor if you experience: fever greater than 101.5, chills, chest pain, difficulty breathing, redness or excessive drainage around the incision, other concerns.  Follow up with your primary care provider. Please see Dr. Benson's separate discharge instruction sheet. Your medications were sent to [facility], located on the first floor of [facility]. Take medications as prescribed.  Weight bear as tolerated with assistive device  No strenuous activity, heavy lifting, driving or returning to work until cleared by MD.  You may shower - dressing is water-resistant, no soaking in bathtubs.  Remove dressing after post op day 5-7, then leave incision open to air. Keep incision clean and dry.  Try to have regular bowel movements, take stool softener or laxative if necessary.  May take Pepcid or Zantac for upset stomach.  May take Aleve or Naproxen instead of Meloxicam/Celebrex.  Swelling may travel all the way down leg to foot, this is normal and will subside in a few weeks.  Call to schedule an appt with Dr. Benson for follow up, if you have staples or sutures they will be removed in office.  Contact your doctor if you experience: fever greater than 101.5, chills, chest pain, difficulty breathing, redness or excessive drainage around the incision, other concerns.  Follow up with your primary care provider.

## 2019-08-01 NOTE — DISCHARGE NOTE PROVIDER - CARE PROVIDER_API CALL
Oscar Benson)  Orthopaedic Surgery  [address], [address] [place] [address]  Phone: [phone]  Fax: [phone]  Follow Up Time: 2 weeks

## 2019-08-01 NOTE — DISCHARGE NOTE PROVIDER - NSDCCPCAREPLAN_GEN_ALL_CORE_FT
PRINCIPAL DISCHARGE DIAGNOSIS  Diagnosis: Osteoarthritis  Assessment and Plan of Treatment: improvement s/p Right anterior THR

## 2019-08-01 NOTE — DISCHARGE NOTE PROVIDER - HOSPITAL COURSE
Admitted    Surgery Right anterior THR    Carmencita-op Antibiotics    Pain control    DVT prophylaxis    OOB/Physical Therapy

## 2019-08-01 NOTE — PROGRESS NOTE ADULT - SUBJECTIVE AND OBJECTIVE BOX
Ortho Note    Pt comfortable without complaints, pain controlled  Denies CP, SOB, N/V, numbness/tingling     Vital Signs Last 24 Hrs  T(C): 36.4 (01 Aug 2019 04:35), Max: 36.7 (31 Jul 2019 15:04)  T(F): 97.6 (01 Aug 2019 04:35), Max: 98.1 (31 Jul 2019 15:04)  HR: 96 (01 Aug 2019 09:10) (58 - 96)  BP: 112/70 (01 Aug 2019 09:10) (96/62 - 143/79)  BP(mean): 90 (31 Jul 2019 15:04) (89 - 93)  RR: 18 (01 Aug 2019 09:10) (17 - 20)  SpO2: 97% (01 Aug 2019 09:10) (96% - 100%)      VSS  General: A&Ox3, NAD  RLE: Aquacell DSG C/D/I  Pulses: Foot WWP; DP pulse 2+; Cap refill < 2 sec  Sensation: SILT distally and symmetric to contralateral extremity  Motor: TA/EHL/FHL/GS 5/5 and symmetric to contralateral extremity                          11.2   10.48 )-----------( 162      ( 01 Aug 2019 07:45 )             35.0     01 Aug 2019 07:45    139    |  102    |  13     ----------------------------<  139    4.0     |  28     |  0.61     Ca    8.7        01 Aug 2019 07:45

## 2019-08-01 NOTE — DISCHARGE NOTE PROVIDER - NSDCACTIVITY_GEN_ALL_CORE
No heavy lifting/straining/Do not drive or operate machinery/Showering allowed/Stairs allowed/Walking - Outdoors allowed/Walking - Indoors allowed
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
## 2019-08-06 DIAGNOSIS — Z91.81 HISTORY OF FALLING: ICD-10-CM

## 2019-08-06 DIAGNOSIS — K21.9 GASTRO-ESOPHAGEAL REFLUX DISEASE WITHOUT ESOPHAGITIS: ICD-10-CM

## 2019-08-06 DIAGNOSIS — Z88.2 ALLERGY STATUS TO SULFONAMIDES: ICD-10-CM

## 2019-08-06 DIAGNOSIS — M85.80 OTHER SPECIFIED DISORDERS OF BONE DENSITY AND STRUCTURE, UNSPECIFIED SITE: ICD-10-CM

## 2019-08-06 DIAGNOSIS — L30.9 DERMATITIS, UNSPECIFIED: ICD-10-CM

## 2019-08-06 DIAGNOSIS — Z98.890 OTHER SPECIFIED POSTPROCEDURAL STATES: ICD-10-CM

## 2019-08-06 DIAGNOSIS — Z86.12 PERSONAL HISTORY OF POLIOMYELITIS: ICD-10-CM

## 2019-08-06 DIAGNOSIS — E78.5 HYPERLIPIDEMIA, UNSPECIFIED: ICD-10-CM

## 2019-08-06 DIAGNOSIS — M16.11 UNILATERAL PRIMARY OSTEOARTHRITIS, RIGHT HIP: ICD-10-CM

## 2019-08-06 DIAGNOSIS — I34.1 NONRHEUMATIC MITRAL (VALVE) PROLAPSE: ICD-10-CM

## 2019-08-06 DIAGNOSIS — M81.0 AGE-RELATED OSTEOPOROSIS WITHOUT CURRENT PATHOLOGICAL FRACTURE: ICD-10-CM

## 2019-08-06 DIAGNOSIS — G89.29 OTHER CHRONIC PAIN: ICD-10-CM

## 2019-08-06 DIAGNOSIS — Z86.19 PERSONAL HISTORY OF OTHER INFECTIOUS AND PARASITIC DISEASES: ICD-10-CM

## 2019-08-06 DIAGNOSIS — Z79.82 LONG TERM (CURRENT) USE OF ASPIRIN: ICD-10-CM

## 2019-08-06 DIAGNOSIS — A60.00 HERPESVIRAL INFECTION OF UROGENITAL SYSTEM, UNSPECIFIED: ICD-10-CM

## 2019-08-06 DIAGNOSIS — Z79.899 OTHER LONG TERM (CURRENT) DRUG THERAPY: ICD-10-CM

## 2019-08-06 DIAGNOSIS — Z96.89 PRESENCE OF OTHER SPECIFIED FUNCTIONAL IMPLANTS: ICD-10-CM

## 2019-08-06 DIAGNOSIS — I65.23 OCCLUSION AND STENOSIS OF BILATERAL CAROTID ARTERIES: ICD-10-CM

## 2019-08-08 LAB — SURGICAL PATHOLOGY STUDY: SIGNIFICANT CHANGE UP

## 2019-08-14 ENCOUNTER — FORM ENCOUNTER (OUTPATIENT)
Age: 69
End: 2019-08-14

## 2019-08-15 ENCOUNTER — OUTPATIENT (OUTPATIENT)
Dept: OUTPATIENT SERVICES | Facility: HOSPITAL | Age: 69
LOS: 1 days | End: 2019-08-15
Payer: MEDICARE

## 2019-08-15 ENCOUNTER — APPOINTMENT (OUTPATIENT)
Dept: ORTHOPEDIC SURGERY | Facility: CLINIC | Age: 69
End: 2019-08-15
Payer: MEDICARE

## 2019-08-15 DIAGNOSIS — Z98.890 OTHER SPECIFIED POSTPROCEDURAL STATES: Chronic | ICD-10-CM

## 2019-08-15 DIAGNOSIS — Z41.9 ENCOUNTER FOR PROCEDURE FOR PURPOSES OTHER THAN REMEDYING HEALTH STATE, UNSPECIFIED: Chronic | ICD-10-CM

## 2019-08-15 PROCEDURE — 99024 POSTOP FOLLOW-UP VISIT: CPT

## 2019-08-15 PROCEDURE — 73501 X-RAY EXAM HIP UNI 1 VIEW: CPT | Mod: 26,RT

## 2019-08-15 PROCEDURE — 73501 X-RAY EXAM HIP UNI 1 VIEW: CPT

## 2019-08-15 RX ORDER — ALENDRONATE SODIUM 70 MG/1
70 TABLET ORAL
Refills: 0 | Status: DISCONTINUED | COMMUNITY
End: 2019-08-15

## 2019-08-15 RX ORDER — OXYCODONE AND ACETAMINOPHEN 5; 325 MG/1; MG/1
5-325 TABLET ORAL
Qty: 50 | Refills: 0 | Status: DISCONTINUED | COMMUNITY
Start: 2019-07-31 | End: 2019-08-15

## 2019-08-15 NOTE — ADDENDUM
[FreeTextEntry1] : The patient’s case was personally discussed with Dr. Benson who was in agreement with the assessment and plan\par \par

## 2019-08-15 NOTE — HISTORY OF PRESENT ILLNESS
[Healed] : healed [Neuro Intact] : an unremarkable neurological exam [Vascular Intact] : ~T peripheral vascular exam normal [Negative Maria Del Carmen's] : maneuvers demonstrated a negative Maria Del Carmen's sign [Doing Well] : is doing well [Excellent Pain Control] : has excellent pain control [No Sign of Infection] : is showing no signs of infection [Erythema] : not erythematous [Discharge] : absent of discharge [de-identified] : First post op right total hip replacement, anterior, surgical date 7/31/19 [Dehiscence] : not dehisced [de-identified] : Patient presents for first post op visit s/p right anterior CHANDA 7/31/19. She states she is doing well overall. She has minimal pain in the hip but does not pain in the right knee, worse with walking on inclines. She is taking Tylenol prn for knee pain, otherwise pain is well controlled without medication. She takes Meloxicam daily and ASA BID for DVT ppx. She has finished home PT and is ambulating with a walker. [de-identified] : Patient is alert and oriented x3.\par Leg lengths clinically equal \par Right hip: Well-healed surgical scar without erythema or ecchymosis. Acceptable post op swelling. ROM from full extension to 100 degrees of flexion, external rotation 50 degrees, internal rotation 5 degrees, abduction 50 degrees, adduction 10 degrees. Calf soft and nontender. Flexor power 5-/5. [de-identified] : X-rays taken today demonstrate well-fixed right total hip replacement in overall good alignment [de-identified] : Patient is progressing well 2 weeks post op. Knee pain should continue to improve as walking and gait improves, if no change we may obtain x-ray at next visit. Otherwise progressing well, gradually transition from walker to cane. Continue HEP and advised to be incremental with increase in activity. Continue ASA BID until 4 weeks post op. Follow up in 4-6 weeks.

## 2019-08-26 ENCOUNTER — APPOINTMENT (OUTPATIENT)
Dept: HEART AND VASCULAR | Facility: CLINIC | Age: 69
End: 2019-08-26
Payer: MEDICARE

## 2019-08-26 ENCOUNTER — LABORATORY RESULT (OUTPATIENT)
Age: 69
End: 2019-08-26

## 2019-08-26 VITALS
WEIGHT: 107 LBS | BODY MASS INDEX: 17.61 KG/M2 | SYSTOLIC BLOOD PRESSURE: 118 MMHG | RESPIRATION RATE: 16 BRPM | HEART RATE: 61 BPM | DIASTOLIC BLOOD PRESSURE: 82 MMHG | OXYGEN SATURATION: 98 % | TEMPERATURE: 97.3 F | HEIGHT: 65.5 IN

## 2019-08-26 DIAGNOSIS — Z87.440 PERSONAL HISTORY OF URINARY (TRACT) INFECTIONS: ICD-10-CM

## 2019-08-26 PROCEDURE — 36415 COLL VENOUS BLD VENIPUNCTURE: CPT

## 2019-08-26 PROCEDURE — 99214 OFFICE O/P EST MOD 30 MIN: CPT

## 2019-08-26 NOTE — PHYSICAL EXAM
[Normal Appearance] : normal appearance [General Appearance - Well Developed] : well developed [Well Groomed] : well groomed [General Appearance - Well Nourished] : well nourished [No Deformities] : no deformities [General Appearance - In No Acute Distress] : no acute distress [Normal Conjunctiva] : the conjunctiva exhibited no abnormalities [Normal Oral Mucosa] : normal oral mucosa [No Oral Cyanosis] : no oral cyanosis [No Oral Pallor] : no oral pallor [FreeTextEntry1] : walking with cane [Skin Turgor] : normal skin turgor [Affect] : the affect was normal [Oriented To Time, Place, And Person] : oriented to person, place, and time [No Anxiety] : not feeling anxious [Mood] : the mood was normal

## 2019-08-26 NOTE — HISTORY OF PRESENT ILLNESS
[FreeTextEntry1] : 68 year female who had THR and did well. but describes shaking chills and fever to 102 on August 24. She called the surgeon who suspected a bladder infection but did not start an antibiotic. She started home test strips for a UTI when her fever went back to 101.5 on August 28 and started herself on Cefuroxime 250 mg bid for 3 days that she had from Dr Smith of . She has had a little urinary urgency but no burning.

## 2019-08-27 LAB
APPEARANCE: CLEAR
BACTERIA: NEGATIVE
BASOPHILS # BLD AUTO: 0 K/UL
BASOPHILS NFR BLD AUTO: 0 %
BILIRUBIN URINE: NEGATIVE
BLOOD URINE: ABNORMAL
COLOR: YELLOW
EOSINOPHIL # BLD AUTO: 0 K/UL
EOSINOPHIL NFR BLD AUTO: 0 %
GLUCOSE QUALITATIVE U: NEGATIVE
HCT VFR BLD CALC: 38.4 %
HGB BLD-MCNC: 12 G/DL
HYALINE CASTS: 0 /LPF
KETONES URINE: NEGATIVE
LEUKOCYTE ESTERASE URINE: ABNORMAL
LYMPHOCYTES # BLD AUTO: 0.87 K/UL
LYMPHOCYTES NFR BLD AUTO: 4.5 %
MAN DIFF?: NORMAL
MCHC RBC-ENTMCNC: 31.3 GM/DL
MCHC RBC-ENTMCNC: 31.3 PG
MCV RBC AUTO: 100 FL
MICROSCOPIC-UA: NORMAL
MONOCYTES # BLD AUTO: 2.43 K/UL
MONOCYTES NFR BLD AUTO: 12.6 %
NEUTROPHILS # BLD AUTO: 15.98 K/UL
NEUTROPHILS NFR BLD AUTO: 82.9 %
NITRITE URINE: NEGATIVE
PH URINE: 6
PLATELET # BLD AUTO: 191 K/UL
PROTEIN URINE: NORMAL
RBC # BLD: 3.84 M/UL
RBC # FLD: 14.6 %
RED BLOOD CELLS URINE: 8 /HPF
SPECIFIC GRAVITY URINE: 1.01
SQUAMOUS EPITHELIAL CELLS: 2 /HPF
UROBILINOGEN URINE: NORMAL
WBC # FLD AUTO: 19.28 K/UL
WHITE BLOOD CELLS URINE: 12 /HPF

## 2019-08-28 LAB — BACTERIA UR CULT: NORMAL

## 2019-09-03 LAB — BACTERIA BLD CULT: NORMAL

## 2019-09-04 ENCOUNTER — APPOINTMENT (OUTPATIENT)
Dept: HEART AND VASCULAR | Facility: CLINIC | Age: 69
End: 2019-09-04
Payer: MEDICARE

## 2019-09-04 VITALS
BODY MASS INDEX: 17.28 KG/M2 | DIASTOLIC BLOOD PRESSURE: 62 MMHG | HEIGHT: 65.5 IN | TEMPERATURE: 97.8 F | WEIGHT: 105 LBS | SYSTOLIC BLOOD PRESSURE: 124 MMHG | OXYGEN SATURATION: 98 % | HEART RATE: 58 BPM | RESPIRATION RATE: 16 BRPM

## 2019-09-04 DIAGNOSIS — D72.829 ELEVATED WHITE BLOOD CELL COUNT, UNSPECIFIED: ICD-10-CM

## 2019-09-04 DIAGNOSIS — Z87.440 PERSONAL HISTORY OF URINARY (TRACT) INFECTIONS: ICD-10-CM

## 2019-09-04 PROCEDURE — 99214 OFFICE O/P EST MOD 30 MIN: CPT

## 2019-09-04 NOTE — HISTORY OF PRESENT ILLNESS
[FreeTextEntry1] : 68 year female who reports one week of complete resolution of her fever and need for Tylenol. We reviewed her prior labs and discussed concern about risk of infection of hardware if she has recurrent UTIs. I asked her to discuss a plan with  regarding threshold to starting antibiotics in the event of early symptoms of UTI to avoid bacteremia

## 2019-09-04 NOTE — PHYSICAL EXAM
[General Appearance - Well Developed] : well developed [Normal Appearance] : normal appearance [Well Groomed] : well groomed [General Appearance - Well Nourished] : well nourished [No Deformities] : no deformities [General Appearance - In No Acute Distress] : no acute distress [Normal Conjunctiva] : the conjunctiva exhibited no abnormalities [Abnormal Walk] : normal gait [Skin Turgor] : normal skin turgor [Oriented To Time, Place, And Person] : oriented to person, place, and time [Affect] : the affect was normal [Mood] : the mood was normal [No Anxiety] : not feeling anxious

## 2019-09-05 LAB
BASOPHILS # BLD AUTO: 0.05 K/UL
BASOPHILS NFR BLD AUTO: 0.7 %
EOSINOPHIL # BLD AUTO: 0.33 K/UL
EOSINOPHIL NFR BLD AUTO: 4.4 %
HCT VFR BLD CALC: 41.3 %
HGB BLD-MCNC: 12.8 G/DL
IMM GRANULOCYTES NFR BLD AUTO: 1.5 %
LYMPHOCYTES # BLD AUTO: 1.54 K/UL
LYMPHOCYTES NFR BLD AUTO: 20.7 %
MAN DIFF?: NORMAL
MCHC RBC-ENTMCNC: 31 GM/DL
MCHC RBC-ENTMCNC: 31.4 PG
MCV RBC AUTO: 101.2 FL
MONOCYTES # BLD AUTO: 0.64 K/UL
MONOCYTES NFR BLD AUTO: 8.6 %
NEUTROPHILS # BLD AUTO: 4.77 K/UL
NEUTROPHILS NFR BLD AUTO: 64.1 %
PLATELET # BLD AUTO: 336 K/UL
RBC # BLD: 4.08 M/UL
RBC # FLD: 15 %
WBC # FLD AUTO: 7.44 K/UL

## 2019-09-12 ENCOUNTER — TRANSCRIPTION ENCOUNTER (OUTPATIENT)
Age: 69
End: 2019-09-12

## 2019-09-12 ENCOUNTER — APPOINTMENT (OUTPATIENT)
Dept: ORTHOPEDIC SURGERY | Facility: CLINIC | Age: 69
End: 2019-09-12
Payer: MEDICARE

## 2019-09-12 PROCEDURE — 99024 POSTOP FOLLOW-UP VISIT: CPT

## 2019-09-12 RX ORDER — PANTOPRAZOLE 40 MG/1
40 TABLET, DELAYED RELEASE ORAL DAILY
Qty: 30 | Refills: 0 | Status: DISCONTINUED | COMMUNITY
Start: 2019-07-31 | End: 2019-09-12

## 2019-09-12 RX ORDER — ASPIRIN/ACETAMINOPHEN/CAFFEINE 500-325-65
325 POWDER IN PACKET (EA) ORAL
Qty: 60 | Refills: 0 | Status: DISCONTINUED | COMMUNITY
Start: 2019-07-31 | End: 2019-09-12

## 2019-09-12 NOTE — HISTORY OF PRESENT ILLNESS
[Healed] : healed [Doing Well] : is doing well [Excellent Pain Control] : has excellent pain control [No Sign of Infection] : is showing no signs of infection [Clean/Dry/Intact] : clean, dry and intact [Chills] : no chills [de-identified] : 68 year old female presents for second post op. She is doing well overall. She does not report any right hip pain but continues to have difficulty placing shoes on the right foot. She denies any pain, swelling, redness at the surgical site. Patient can walk greater than 10 blocks unassisted. She uses a rail to ascend and descend stairs. She can sit comfortably in an ordinary chair and can enter public transportation. She is not taking anything for pain.\par Of note, patient reports some low back pain. Ms. Lopez has a h/o recurrent UTIs. She just recently finished a 10 day course of antibiotics but now feels as if she may have another UTI. She had a fever a few days ago and went to urgent care where they did cultures. She states that she is following up with a urologist but it's hard to get an appointment with her urologist.  [de-identified] : Second post op right total hip replacement, anterior, surgical date 7/31/19.  [de-identified] : Patient is alert and oriented x3.\par Leg lengths clinically equal.\par Right hip: Well-healed anterior surgical scar without erythema or ecchymosis. Acceptable post-op swelling. ROM from full extension to 105 degrees of flexion. 20 degrees of internal rotation. 55 degrees of external rotation. 55-60 degrees of abduction. 20 degrees of adduction. Flexor power 5/5.\par Calf is soft and nontender.\par NVI. [de-identified] : No new imaging was reviewed at this time. [de-identified] : Ms. Lopez is doing well. I informed her that she is allowed to do physical therapy but I do not always recommend it for patients who are recovering well from THR because physical therapy can occasionally cause patients to have set backs in their recovery. I wrote a prescription for physical therapy but encouraged her to communicate with the therapist so that she is not doing any very painful stretches or exercises.\par I informed patient that her UTIs are unrelated to her hip replacement but that she should have all infections treated so that they don't enter the blood stream and spread throughout her body.\par Follow up in 3 months.

## 2019-09-12 NOTE — END OF VISIT
[FreeTextEntry3] : All medical record entries made by Liz Lombardo acting as a scribe for the performing provider (Oscar Benson MD and/or JOAQUIN Pitts) on 09/12/2019. All entries were dictated to me by the performing medical provider. In signing this record, the medical provider affirms that they have personally performed the history, physical exam, assessment and plan and have also directed, reviewed and agreed to the documentation in the chart.

## 2019-10-01 PROCEDURE — 81003 URINALYSIS AUTO W/O SCOPE: CPT

## 2019-10-01 PROCEDURE — C1776: CPT

## 2019-10-01 PROCEDURE — 97161 PT EVAL LOW COMPLEX 20 MIN: CPT

## 2019-10-01 PROCEDURE — 97116 GAIT TRAINING THERAPY: CPT

## 2019-10-01 PROCEDURE — 36415 COLL VENOUS BLD VENIPUNCTURE: CPT

## 2019-10-01 PROCEDURE — 76000 FLUOROSCOPY <1 HR PHYS/QHP: CPT

## 2019-10-01 PROCEDURE — 97530 THERAPEUTIC ACTIVITIES: CPT

## 2019-10-01 PROCEDURE — 86850 RBC ANTIBODY SCREEN: CPT

## 2019-10-01 PROCEDURE — 86803 HEPATITIS C AB TEST: CPT

## 2019-10-01 PROCEDURE — 86900 BLOOD TYPING SEROLOGIC ABO: CPT

## 2019-10-01 PROCEDURE — 80048 BASIC METABOLIC PNL TOTAL CA: CPT

## 2019-10-01 PROCEDURE — 86901 BLOOD TYPING SEROLOGIC RH(D): CPT

## 2019-10-01 PROCEDURE — 97110 THERAPEUTIC EXERCISES: CPT

## 2019-10-01 PROCEDURE — 85027 COMPLETE CBC AUTOMATED: CPT

## 2019-10-01 PROCEDURE — 88300 SURGICAL PATH GROSS: CPT

## 2019-10-01 PROCEDURE — 72170 X-RAY EXAM OF PELVIS: CPT

## 2019-11-05 ENCOUNTER — APPOINTMENT (OUTPATIENT)
Dept: ORTHOPEDIC SURGERY | Facility: CLINIC | Age: 69
End: 2019-11-05
Payer: MEDICARE

## 2019-11-05 VITALS — BODY MASS INDEX: 16.95 KG/M2 | HEIGHT: 65.5 IN | WEIGHT: 103 LBS

## 2019-11-05 DIAGNOSIS — Z87.440 PERSONAL HISTORY OF URINARY (TRACT) INFECTIONS: ICD-10-CM

## 2019-11-05 PROCEDURE — 99213 OFFICE O/P EST LOW 20 MIN: CPT

## 2019-11-05 RX ORDER — MELOXICAM 15 MG/1
15 TABLET ORAL
Qty: 42 | Refills: 0 | Status: DISCONTINUED | COMMUNITY
Start: 2019-07-31 | End: 2019-11-05

## 2019-11-05 RX ORDER — CEFUROXIME AXETIL 250 MG/1
250 TABLET ORAL
Qty: 20 | Refills: 0 | Status: DISCONTINUED | COMMUNITY
End: 2019-11-05

## 2019-11-05 NOTE — DISCUSSION/SUMMARY
[de-identified] : Ms. Lopez is doing well 3 months s/p right THR. Patient has greater than average hip ROM but we discussed how she can improve her ability to clip her toe nails with additional stretching/exercises. We discussed how many patients do not do outpatient physical therapy after hip replacements but I wrote patient a physical therapy prescription to help patient work on strength and coordination. I advised against doing leg lifts to the front for at least a year after the operation because they can cause hip flexor tendinitis. We discussed previous the X-ray finding of arthritis in the patient's lumbar spine. I assured her that the arthritis is not severe and that physical therapy to strengthen the core will help with this. I answered any additional questions that the patient had. \par I wrote an antibiotic prescription for dental work but informed patient that she can get the abx prescription from her dentist in the future. I gave patient a piece of paper with information on antibiotic use for dental work after joint replacement and advised her to show it to her dentist. \par Follow up one year post surgery or sooner, PRN.

## 2019-11-05 NOTE — HISTORY OF PRESENT ILLNESS
[de-identified] : 69 year old F presents today for follow up about 3 months s/p right THR. She is doing well overall. She reports little to no right hip pain that she is able to ignore. The pain occurs after prolonged sitting and is localized to the thigh and side of the hip. Patient can walk greater than 10 blocks unassisted and reports that she's usually only limited in her walking distance if she is carrying something heavy. She can enter public transportation but reports that she climbs the stairs to the subway very slowly. She can sit comfortably in an ordinary chair. She reports that she can place shoes and socks on her right foot but has a lot of difficulty cutting her toe nails. \par Of note, patient reports that she is currently on a low dose of oral antibiotics for recurrent UTIs.

## 2019-11-05 NOTE — PHYSICAL EXAM
[de-identified] : Well appearing. NAD. Alert and oriented x 3. No respiratory distress.\par Bilateral upper extremities: Skin intact. No deformity. Painless active ROM without evident restriction.\par Cervical, thoracic and lumbar spine: No visible deformity. Painless active ROM without evident restriction. No radicular pain on passive straight leg raise bilaterally.\par \par Pelvis: No pelvic obliquity. No tenderness.\par Leg lengths: Equal.\par \par Gait: Normal.\par Ambulatory assist devices: None.\par \par Right Hip:\par Skin intact. (+) Well healed anterior surgical scar. No erythema. No ecchymosis. No swelling. No deformity. No focal tenderness.\par Painless ROM from full extension to 120 degrees of flexion. 35 degrees of internal rotation. 55-60 degrees of external rotation. 60 degrees of abduction. 15-20 degrees of adduction.\par KATJA painless. Impingement (FADIR) painless. Stinchfield painless. No crepitation. No instability.\par \par Left Hip:\par Skin intact. No surgical scars. No erythema. No ecchymosis. No swelling. No deformity. No focal tenderness.\par Painless ROM without evident restriction.\par No crepitation. No instability.\par \par Bilateral Knees: Skin intact. No surgical scars. No erythema or ecchymosis. No swelling or effusion. No deformity. Painless and unrestricted range of motion. Central patellar tracking. No crepitation. No instability.\par \par Neurological: Intact distal crude touch sensation. Normal distal motor power. \par Cardiovascular: Lower extremities warm and well perfused. No peripheral edema. [de-identified] : No new imaging was reviewed at this time.

## 2019-11-05 NOTE — END OF VISIT
[FreeTextEntry3] : All medical record entries made by Liz Lombardo acting as a scribe for the performing provider (Oscar Benson MD and/or JOAQUIN Pitts) on 11/05/2019. All entries were dictated to me by the performing medical provider. In signing this record, the medical provider affirms that they have personally performed the history, physical exam, assessment and plan and have also directed, reviewed and agreed to the documentation in the chart.

## 2019-11-18 ENCOUNTER — FORM ENCOUNTER (OUTPATIENT)
Age: 69
End: 2019-11-18

## 2019-12-11 ENCOUNTER — RX RENEWAL (OUTPATIENT)
Age: 69
End: 2019-12-11

## 2020-02-21 ENCOUNTER — TRANSCRIPTION ENCOUNTER (OUTPATIENT)
Age: 70
End: 2020-02-21

## 2020-02-26 ENCOUNTER — RX RENEWAL (OUTPATIENT)
Age: 70
End: 2020-02-26

## 2020-05-26 ENCOUNTER — RX RENEWAL (OUTPATIENT)
Age: 70
End: 2020-05-26

## 2020-07-09 ENCOUNTER — APPOINTMENT (OUTPATIENT)
Dept: ENDOCRINOLOGY | Facility: CLINIC | Age: 70
End: 2020-07-09

## 2020-07-31 ENCOUNTER — RX RENEWAL (OUTPATIENT)
Age: 70
End: 2020-07-31

## 2020-10-23 ENCOUNTER — RX RENEWAL (OUTPATIENT)
Age: 70
End: 2020-10-23

## 2020-12-21 PROBLEM — Z87.440 HISTORY OF URINARY TRACT INFECTION: Status: RESOLVED | Noted: 2019-08-26 | Resolved: 2020-12-21

## 2021-02-08 ENCOUNTER — RX RENEWAL (OUTPATIENT)
Age: 71
End: 2021-02-08

## 2021-02-16 ENCOUNTER — RX RENEWAL (OUTPATIENT)
Age: 71
End: 2021-02-16

## 2021-03-30 ENCOUNTER — APPOINTMENT (OUTPATIENT)
Dept: HEART AND VASCULAR | Facility: CLINIC | Age: 71
End: 2021-03-30
Payer: MEDICARE

## 2021-03-30 VITALS
SYSTOLIC BLOOD PRESSURE: 132 MMHG | DIASTOLIC BLOOD PRESSURE: 80 MMHG | TEMPERATURE: 97.6 F | RESPIRATION RATE: 16 BRPM | OXYGEN SATURATION: 100 % | BODY MASS INDEX: 16.47 KG/M2 | HEART RATE: 62 BPM | WEIGHT: 100.05 LBS | HEIGHT: 65.5 IN

## 2021-03-30 PROCEDURE — 99214 OFFICE O/P EST MOD 30 MIN: CPT

## 2021-03-30 PROCEDURE — 93000 ELECTROCARDIOGRAM COMPLETE: CPT

## 2021-03-30 PROCEDURE — 36415 COLL VENOUS BLD VENIPUNCTURE: CPT

## 2021-03-30 RX ORDER — NITROFURANTOIN MACROCRYSTALS 50 MG/1
50 CAPSULE ORAL
Qty: 30 | Refills: 0 | Status: DISCONTINUED | COMMUNITY
Start: 2019-10-22 | End: 2021-03-30

## 2021-03-30 NOTE — PHYSICAL EXAM
[General Appearance - Well Developed] : well developed [Normal Appearance] : normal appearance [General Appearance - Well Nourished] : well nourished [Well Groomed] : well groomed [No Deformities] : no deformities [General Appearance - In No Acute Distress] : no acute distress [Normal Conjunctiva] : the conjunctiva exhibited no abnormalities [] : no respiratory distress [Respiration, Rhythm And Depth] : normal respiratory rhythm and effort [Exaggerated Use Of Accessory Muscles For Inspiration] : no accessory muscle use [Auscultation Breath Sounds / Voice Sounds] : lungs were clear to auscultation bilaterally [Abdomen Soft] : soft [Heart Sounds] : normal S1 and S2 [Abnormal Walk] : normal gait [FreeTextEntry1] : no edema [Skin Turgor] : normal skin turgor [Oriented To Time, Place, And Person] : oriented to person, place, and time [Affect] : the affect was normal [Mood] : the mood was normal [No Anxiety] : not feeling anxious

## 2021-03-30 NOTE — HISTORY OF PRESENT ILLNESS
[FreeTextEntry1] : 70 year female who was living in [facility] for the past year. She has gotten Covid vaccinated and is returning to [facility]. She did not have labs or see a doctor in the past year. She had a mammogram and saw GYN. she is planning to see her breast surgeon. She notes that her mask impedes her from taking a deep breath

## 2021-03-31 LAB
25(OH)D3 SERPL-MCNC: 61.1 NG/ML
ALBUMIN SERPL ELPH-MCNC: 4.7 G/DL
ALP BLD-CCNC: 65 U/L
ALT SERPL-CCNC: 19 U/L
ANION GAP SERPL CALC-SCNC: 14 MMOL/L
AST SERPL-CCNC: 25 U/L
BASOPHILS # BLD AUTO: 0.03 K/UL
BASOPHILS NFR BLD AUTO: 0.4 %
BILIRUB SERPL-MCNC: 0.4 MG/DL
BUN SERPL-MCNC: 21 MG/DL
CALCIUM SERPL-MCNC: 10.3 MG/DL
CHLORIDE SERPL-SCNC: 101 MMOL/L
CHOLEST SERPL-MCNC: 180 MG/DL
CO2 SERPL-SCNC: 27 MMOL/L
CREAT SERPL-MCNC: 0.71 MG/DL
EOSINOPHIL # BLD AUTO: 0.13 K/UL
EOSINOPHIL NFR BLD AUTO: 1.9 %
ESTIMATED AVERAGE GLUCOSE: 120 MG/DL
FOLATE SERPL-MCNC: >20 NG/ML
GLUCOSE SERPL-MCNC: 94 MG/DL
HBA1C MFR BLD HPLC: 5.8 %
HCT VFR BLD CALC: 42.3 %
HDLC SERPL-MCNC: 102 MG/DL
HGB BLD-MCNC: 13.5 G/DL
IMM GRANULOCYTES NFR BLD AUTO: 0.1 %
LDLC SERPL CALC-MCNC: 65 MG/DL
LYMPHOCYTES # BLD AUTO: 1.57 K/UL
LYMPHOCYTES NFR BLD AUTO: 23 %
MAN DIFF?: NORMAL
MCHC RBC-ENTMCNC: 31.9 GM/DL
MCHC RBC-ENTMCNC: 32.1 PG
MCV RBC AUTO: 100.5 FL
MONOCYTES # BLD AUTO: 0.69 K/UL
MONOCYTES NFR BLD AUTO: 10.1 %
NEUTROPHILS # BLD AUTO: 4.4 K/UL
NEUTROPHILS NFR BLD AUTO: 64.5 %
NONHDLC SERPL-MCNC: 77 MG/DL
PLATELET # BLD AUTO: 224 K/UL
POTASSIUM SERPL-SCNC: 4.8 MMOL/L
PROT SERPL-MCNC: 7.3 G/DL
RBC # BLD: 4.21 M/UL
RBC # FLD: 13.5 %
SODIUM SERPL-SCNC: 142 MMOL/L
TRIGL SERPL-MCNC: 61 MG/DL
TSH SERPL-ACNC: 2.66 UIU/ML
VIT B12 SERPL-MCNC: 1096 PG/ML
WBC # FLD AUTO: 6.83 K/UL

## 2021-04-01 LAB

## 2021-04-07 LAB — ALP BONE SERPL-MCNC: 12.2 UG/L

## 2021-05-06 ENCOUNTER — APPOINTMENT (OUTPATIENT)
Dept: ENDOCRINOLOGY | Facility: CLINIC | Age: 71
End: 2021-05-06
Payer: MEDICARE

## 2021-05-06 VITALS
DIASTOLIC BLOOD PRESSURE: 50 MMHG | SYSTOLIC BLOOD PRESSURE: 96 MMHG | HEIGHT: 64.5 IN | BODY MASS INDEX: 17.03 KG/M2 | HEART RATE: 68 BPM | WEIGHT: 101 LBS

## 2021-05-06 PROCEDURE — 99213 OFFICE O/P EST LOW 20 MIN: CPT

## 2021-05-06 RX ORDER — AMOXICILLIN 500 MG/1
500 CAPSULE ORAL
Qty: 4 | Refills: 2 | Status: DISCONTINUED | COMMUNITY
Start: 2019-11-05 | End: 2021-05-06

## 2021-06-17 ENCOUNTER — RESULT REVIEW (OUTPATIENT)
Age: 71
End: 2021-06-17

## 2021-06-17 ENCOUNTER — OUTPATIENT (OUTPATIENT)
Dept: OUTPATIENT SERVICES | Facility: HOSPITAL | Age: 71
LOS: 1 days | End: 2021-06-17
Payer: MEDICARE

## 2021-06-17 ENCOUNTER — APPOINTMENT (OUTPATIENT)
Dept: ORTHOPEDIC SURGERY | Facility: CLINIC | Age: 71
End: 2021-06-17
Payer: MEDICARE

## 2021-06-17 VITALS
SYSTOLIC BLOOD PRESSURE: 110 MMHG | HEIGHT: 64 IN | DIASTOLIC BLOOD PRESSURE: 60 MMHG | BODY MASS INDEX: 17.07 KG/M2 | WEIGHT: 100 LBS

## 2021-06-17 DIAGNOSIS — S73.192A OTHER SPRAIN OF LEFT HIP, INITIAL ENCOUNTER: ICD-10-CM

## 2021-06-17 DIAGNOSIS — Z41.9 ENCOUNTER FOR PROCEDURE FOR PURPOSES OTHER THAN REMEDYING HEALTH STATE, UNSPECIFIED: Chronic | ICD-10-CM

## 2021-06-17 DIAGNOSIS — Z98.890 OTHER SPECIFIED POSTPROCEDURAL STATES: Chronic | ICD-10-CM

## 2021-06-17 DIAGNOSIS — M47.816 SPONDYLOSIS W/OUT MYELOPATHY OR RADICULOPATHY, LUMBAR REGION: ICD-10-CM

## 2021-06-17 PROCEDURE — 73521 X-RAY EXAM HIPS BI 2 VIEWS: CPT

## 2021-06-17 PROCEDURE — 72020 X-RAY EXAM OF SPINE 1 VIEW: CPT | Mod: 26

## 2021-06-17 PROCEDURE — 99214 OFFICE O/P EST MOD 30 MIN: CPT

## 2021-06-17 PROCEDURE — 72020 X-RAY EXAM OF SPINE 1 VIEW: CPT

## 2021-06-17 PROCEDURE — 73521 X-RAY EXAM HIPS BI 2 VIEWS: CPT | Mod: 26

## 2021-06-17 NOTE — HISTORY OF PRESENT ILLNESS
[___ mths] : [unfilled] month(s) ago [Intermit.] : ~He/She~ states the symptoms seem to be intermittent [Bending] : worsened by bending [Walking] : worsened by walking [Acetaminophen] : relieved by acetaminophen [Rest] : relieved by rest [2] : a current pain level of 2/10 [de-identified] : 6/17/21: 69y/o female here with  to follow up on R CHANDA (7/31/19, [facility]) and to assess left hip pain. Left hip symptoms started at the beginning of May. Pain localized to the anterolateral hip, relatively mild, only after walking 2+ miles. Has not attempted any treatment other than Tylenol. Right hip is doing well. Mostly she is here today to ensure that the left hip is not becoming arthritic. [de-identified] : describes a dull pain on the left hip.

## 2021-06-17 NOTE — PHYSICAL EXAM
[de-identified] : General appearance: pleasant, alert and oriented x 3, cooperative.  Very thin bordering on cachectic.\par HEENT: normocephalic, EOM intact, wearing mask, external auditory canal clear.  \par Cardiovascular: no lower leg edema, no varicosities, dorsalis pedis pulses palpable and symmetric.  \par Lymphatics: no palpable lymphadenopathy, no lymphedema.  \par Neurologic: sensation is normal, no muscle weakness in upper or lower extremities, patella tendon reflexes present and symmetric.  \par Dermatologic: skin moist, warm, no rash.  \par Spine: cervical spine with normal lordosis and painless range of motion, thoracic spine with normal kyphosis and painless range of motion, lumbosacral spine with reduced lordosis and stiff range of motion.  Mild tenderness to palpation along midline lumbar spine and paraspinal musculature.  Sacroiliac joints nontender bilaterally. Negative SLR and crossed SLR tests bilaterally.\par Gait: normal.  \par \par Limb lengths: clinically equal\par \par Left hip:\par - Swelling: none\par - Ecchymosis: none\par - Erythema: none\par - Wounds: none \par - Tenderness: anterior\par - ROM: 140 flexion, 0 extension, 20 adduction, 70 abduction, 50 internal rotation, 90 external rotation\par - KATJA: painless\par - FADIR: mildly uncomfortable\par - Eunice: negative\par - Stinchfield: mildly uncomfortable\par - Flexor power: 5/5\par - Abductor power: 55\par \par Right hip:\par - Swelling: none\par - Ecchymosis: none\par - Erythema: none\par - Wounds: healed anterior incision\par - Tenderness: none\par - ROM: 140 flexion, 0 extension, 20 adduction, 70 abduction, 50 internal rotation, 90 external rotation\par - KATJA: painless\par - FADIR: painless\par - Eunice: negative\par - Stinchfield: negative\par - Flexor power: 5/5\par - Abductor power: 5/5 [de-identified] : A lateral view of the lumbosacral spine, weightbearing AP pelvis, and 2 additional views (frog lateral and false profile) of the bilateral hips were obtained today, interpreted by me, and reviewed with the patient.\par \par Lumbar spine --\par Alignment: normal\par Spondylosis: severe multilevel particularly bad at L4/5\par Listhesis: none\par Posterior elements: mild multilevel facet arthrosis\par \par Pelvic alignment: normal\par \par Right hip -- CHANDA in position; all components well fixed in good alignment without evidence of complication.\par \par Left hip --\par Alignment: coxa profunda\par Arthritis: none\par Deformity: none\par Osteonecrosis: none\par

## 2021-06-17 NOTE — DISCUSSION/SUMMARY
[de-identified] : 71y/o female with well-functioning right CHANDA, left hip possible labral tear, lumbar spondylosis\par - Reassured her that the left hip is not showing any evidence of significant arthritis. Doubt anything surgical here\par - Begin PT\par - HEP encouraged\par - Tylenol + meloxicam as needed; precautions reviewed\par - Follow up annually with repeat bilateral hip XRs or earlier as needed

## 2021-08-11 ENCOUNTER — RX RENEWAL (OUTPATIENT)
Age: 71
End: 2021-08-11

## 2021-08-13 NOTE — PHYSICAL EXAM
[Alert] : alert [No Acute Distress] : no acute distress [No Proptosis] : no proptosis [No Lid Lag] : no lid lag [Normal Hearing] : hearing was normal [No LAD] : no lymphadenopathy [Clear to Auscultation] : lungs were clear to auscultation bilaterally [Normal S1, S2] : normal S1 and S2 [Regular Rhythm] : with a regular rhythm [No Spinal Tenderness] : no spinal tenderness [Normal Affect] : the affect was normal [Normal Mood] : the mood was normal [Kyphosis] : no kyphosis present [Scoliosis] : no scoliosis [Acanthosis Nigricans] : no acanthosis nigricans [de-identified] : thyroid not palpable [de-identified] : soft, short systolic murmur

## 2021-08-13 NOTE — HISTORY OF PRESENT ILLNESS
[FreeTextEntry1] : Off alendronate since July 2019.\par no falls or fractures since\par was in [facility] during pandemic\par had R hip replacement in July 2019\par fully vaccinated for Covid (Moderna)\par was physically active during summer, but less so in the winter (cold weather and snow) and then she felt more stiff\par \par PMH: Osteoporosis. no fractures.  alendronate use 8/98 - 9/06, 7/15-7/19. menopause age 53, after HRT\par prediabetes, A1c 6.1  (2017)\par prone to hyperkalemia. \par \par Meds:\par simvastatin 40mg\par Premarin half tablet BIW\par aspirin 81mg -- stopped\par acyclovir 500mg daily\par antibiotics for dental procedure\par calcium/D  600mg in am\par vitamin D 800/day

## 2021-08-13 NOTE — ADDENDUM
[FreeTextEntry1] : bone density reviewed, 8/21:  stable\par spine T -.21, fem neck T -1.3, hip T -0.8, 1/3 radius  T -1.8.

## 2021-08-13 NOTE — DATA REVIEWED
[FreeTextEntry1] : 3/21: BSAP 12.2, A1c 5.8%, tot chol 180, trig 61, , LDL 65, TSH 2.66, 25D 61.1\par 4/19: A1c 6.1%, Cr 0.74, 25D 55.7, \par 11/18: a1c 5.9%\par 1/18: A1c 5.6%, K 5.2, urine  CTX 20, 25D 65\par 6/17: urine alon 0.3 mcg/24 hr. renin 8.4, \par 3/17: A1c 6.1%, tot chol 175, trig 49, , LDL 61, alon 29.5\par 11/16: A1c 5.9%, tot chol 174, trig 42, , LDL 53, 25D 58.8\par \par bone density 6/19:\par L1-L3 0.780, T -2.2, prev -2.1 (2017), -2.4 (2015)\par L hip 0.822, T -1.0, prev -0.9 (2017), -0.9 (2015)\par fem neck 0.714, T -1.2, prev -1.3 (2017) -1.2 (2015)\par \par

## 2021-08-13 NOTE — ASSESSMENT
[FreeTextEntry1] : Osteoporosis.  Currently on drug holiday.\par Will check bone density this summer.\par her last drug holiday lasted 10 years...this drug holiday will probably be shorter than that but I expect she could likely be off meds for a few years, unless there is a change in her health, such as new fracture, or new medications that impact bone (eg steroids)\par continue calcium, vitamin D and regular exercise.\par \par RTO 2 years

## 2021-10-29 ENCOUNTER — APPOINTMENT (OUTPATIENT)
Dept: HEART AND VASCULAR | Facility: CLINIC | Age: 71
End: 2021-10-29
Payer: MEDICARE

## 2021-10-29 ENCOUNTER — NON-APPOINTMENT (OUTPATIENT)
Age: 71
End: 2021-10-29

## 2021-10-29 VITALS
OXYGEN SATURATION: 99 % | DIASTOLIC BLOOD PRESSURE: 75 MMHG | HEART RATE: 54 BPM | SYSTOLIC BLOOD PRESSURE: 122 MMHG | RESPIRATION RATE: 16 BRPM | WEIGHT: 100 LBS | BODY MASS INDEX: 17.07 KG/M2 | HEIGHT: 64 IN | TEMPERATURE: 97.8 F

## 2021-10-29 DIAGNOSIS — I49.8 OTHER SPECIFIED CARDIAC ARRHYTHMIAS: ICD-10-CM

## 2021-10-29 PROCEDURE — 99397 PER PM REEVAL EST PAT 65+ YR: CPT | Mod: GY

## 2021-10-29 PROCEDURE — 36415 COLL VENOUS BLD VENIPUNCTURE: CPT

## 2021-10-29 PROCEDURE — 93000 ELECTROCARDIOGRAM COMPLETE: CPT

## 2021-10-29 PROCEDURE — 99213 OFFICE O/P EST LOW 20 MIN: CPT

## 2021-10-29 RX ORDER — MELOXICAM 7.5 MG/1
7.5 TABLET ORAL DAILY
Qty: 30 | Refills: 2 | Status: DISCONTINUED | COMMUNITY
Start: 2021-06-17 | End: 2021-10-29

## 2021-10-29 NOTE — PHYSICAL EXAM
[Well Developed] : well developed [Normal Conjunctiva] : normal conjunctiva [Normal S1, S2] : normal S1, S2 [Clear Lung Fields] : clear lung fields [Normal Gait] : normal gait [No Rash] : no rash [Moves all extremities] : moves all extremities [Normal] : alert and oriented, normal memory

## 2021-10-29 NOTE — ASSESSMENT
[FreeTextEntry1] : An EKG was performed to evaluate for arrhythmia and ischemia. \par \par Constipation, sinus arrhythmia--Colace, fiber and MiraLAX. See GI. Labs were drawn in the office and sent. Arti. Colonoscopy -given contact to [facility].

## 2021-10-29 NOTE — HISTORY OF PRESENT ILLNESS
[FreeTextEntry1] : 71 year female who had Flu and Moderna booster vaccinations earlier this week (10/27). She denies having chest pain, SOB, MCDERMOTT, dizziness, palpitations, orthopnea, PND or syncope. She feels lightheaded when not eating. [place]. She recalls an inappropriately fast HR after walking a few months ago. She is overdue for colonoscopy. Dr Palmer retired

## 2021-11-01 LAB
ALBUMIN SERPL ELPH-MCNC: 4.8 G/DL
ALP BLD-CCNC: 67 U/L
ALT SERPL-CCNC: 18 U/L
ANION GAP SERPL CALC-SCNC: 13 MMOL/L
APPEARANCE: ABNORMAL
AST SERPL-CCNC: 25 U/L
BACTERIA: ABNORMAL
BASOPHILS # BLD AUTO: 0.02 K/UL
BASOPHILS NFR BLD AUTO: 0.3 %
BILIRUB SERPL-MCNC: 0.5 MG/DL
BILIRUBIN URINE: NEGATIVE
BLOOD URINE: ABNORMAL
BUN SERPL-MCNC: 15 MG/DL
CALCIUM SERPL-MCNC: 10 MG/DL
CHLORIDE SERPL-SCNC: 103 MMOL/L
CHOLEST SERPL-MCNC: 170 MG/DL
CO2 SERPL-SCNC: 26 MMOL/L
COLOR: YELLOW
CREAT SERPL-MCNC: 0.71 MG/DL
EOSINOPHIL # BLD AUTO: 0.2 K/UL
EOSINOPHIL NFR BLD AUTO: 3.3 %
ESTIMATED AVERAGE GLUCOSE: 123 MG/DL
GLUCOSE QUALITATIVE U: NEGATIVE
GLUCOSE SERPL-MCNC: 95 MG/DL
HBA1C MFR BLD HPLC: 5.9 %
HCT VFR BLD CALC: 42.9 %
HDLC SERPL-MCNC: 101 MG/DL
HGB BLD-MCNC: 13.5 G/DL
HYALINE CASTS: 4 /LPF
IMM GRANULOCYTES NFR BLD AUTO: 0.2 %
KETONES URINE: NORMAL
LDLC SERPL CALC-MCNC: 59 MG/DL
LEUKOCYTE ESTERASE URINE: ABNORMAL
LYMPHOCYTES # BLD AUTO: 1.22 K/UL
LYMPHOCYTES NFR BLD AUTO: 20.4 %
MAGNESIUM SERPL-MCNC: 2.2 MG/DL
MAN DIFF?: NORMAL
MCHC RBC-ENTMCNC: 31.5 GM/DL
MCHC RBC-ENTMCNC: 32.8 PG
MCV RBC AUTO: 104.4 FL
MICROSCOPIC-UA: NORMAL
MONOCYTES # BLD AUTO: 0.66 K/UL
MONOCYTES NFR BLD AUTO: 11 %
NEUTROPHILS # BLD AUTO: 3.87 K/UL
NEUTROPHILS NFR BLD AUTO: 64.8 %
NITRITE URINE: POSITIVE
NONHDLC SERPL-MCNC: 69 MG/DL
PH URINE: 5.5
PLATELET # BLD AUTO: 187 K/UL
POTASSIUM SERPL-SCNC: 4.8 MMOL/L
PROT SERPL-MCNC: 6.9 G/DL
PROTEIN URINE: NORMAL
RBC # BLD: 4.11 M/UL
RBC # FLD: 14 %
RED BLOOD CELLS URINE: 6 /HPF
SODIUM SERPL-SCNC: 142 MMOL/L
SPECIFIC GRAVITY URINE: 1.02
SQUAMOUS EPITHELIAL CELLS: 3 /HPF
TRIGL SERPL-MCNC: 51 MG/DL
TSH SERPL-ACNC: 2.29 UIU/ML
UROBILINOGEN URINE: NORMAL
WBC # FLD AUTO: 5.98 K/UL
WHITE BLOOD CELLS URINE: 91 /HPF

## 2021-11-02 ENCOUNTER — NON-APPOINTMENT (OUTPATIENT)
Age: 71
End: 2021-11-02

## 2021-11-03 LAB
FOLATE SERPL-MCNC: 15.6 NG/ML
VIT B12 SERPL-MCNC: 933 PG/ML

## 2021-11-05 LAB

## 2021-11-30 ENCOUNTER — NON-APPOINTMENT (OUTPATIENT)
Age: 71
End: 2021-11-30

## 2021-12-15 ENCOUNTER — APPOINTMENT (OUTPATIENT)
Dept: HEMATOLOGY ONCOLOGY | Facility: CLINIC | Age: 71
End: 2021-12-15
Payer: MEDICARE

## 2021-12-15 VITALS
TEMPERATURE: 97.4 F | WEIGHT: 101 LBS | HEIGHT: 64 IN | BODY MASS INDEX: 17.24 KG/M2 | SYSTOLIC BLOOD PRESSURE: 140 MMHG | HEART RATE: 62 BPM | DIASTOLIC BLOOD PRESSURE: 71 MMHG | OXYGEN SATURATION: 98 %

## 2021-12-15 PROCEDURE — 99204 OFFICE O/P NEW MOD 45 MIN: CPT

## 2021-12-15 RX ORDER — ESTROGENS, CONJUGATED 0.45 MG/1
TABLET, FILM COATED ORAL
Refills: 0 | Status: DISCONTINUED | COMMUNITY
End: 2021-12-15

## 2021-12-15 NOTE — ASSESSMENT
[FreeTextEntry1] : Discussed with patient possible causes of macrocytosis... She denies taking any other OTC meds... I therefore think that her macrocytosis is secondary to her EtOH intake...\par \par I advised patient to avoid all EtOH intake for 2 months, and have repeat blood work done in February in her PCPs office Dr. Tellez.
no

## 2021-12-15 NOTE — HISTORY OF PRESENT ILLNESS
[de-identified] : 71 years old white female was found to have macrocytosis and was sent here for consultation... Work-up so far shows a normal TSH and normal vitamin B12... Patient admits to EtOH intake of beer a light beer daily as well as a small drink of scotch... Her BMI is 17...

## 2021-12-15 NOTE — CONSULT LETTER
[Dear  ___] : Dear  [unfilled], [Consult Letter:] : I had the pleasure of evaluating your patient, [unfilled]. [Please see my note below.] : Please see my note below. [Consult Closing:] : Thank you very much for allowing me to participate in the care of this patient.  If you have any questions, please do not hesitate to contact me. [Sincerely,] : Sincerely, [FreeTextEntry3] : Leyda Moraes MD\par

## 2022-01-13 ENCOUNTER — APPOINTMENT (OUTPATIENT)
Dept: HEART AND VASCULAR | Facility: CLINIC | Age: 72
End: 2022-01-13
Payer: MEDICARE

## 2022-01-13 VITALS
DIASTOLIC BLOOD PRESSURE: 72 MMHG | SYSTOLIC BLOOD PRESSURE: 130 MMHG | BODY MASS INDEX: 17.07 KG/M2 | WEIGHT: 100 LBS | RESPIRATION RATE: 16 BRPM | TEMPERATURE: 97.2 F | HEART RATE: 40 BPM | HEIGHT: 64 IN | OXYGEN SATURATION: 98 %

## 2022-01-13 PROCEDURE — 99213 OFFICE O/P EST LOW 20 MIN: CPT | Mod: 25

## 2022-01-13 PROCEDURE — 93306 TTE W/DOPPLER COMPLETE: CPT

## 2022-01-13 PROCEDURE — 93015 CV STRESS TEST SUPVJ I&R: CPT

## 2022-01-13 NOTE — ASSESSMENT
[FreeTextEntry1] : At the time of the patient's visit an Echocardiogram was performed to evaluate LV function. \par \par At the time of the patient's visit a Stress Test was performed to evaluate for exercise induced arrhythmia and ischemia. At the time of the visit the results were reviewed with patient \par \par I asked her to return for a stress-echocardiogram and CBC

## 2022-01-13 NOTE — PHYSICAL EXAM
[Well Developed] : well developed [Normal Conjunctiva] : normal conjunctiva [Normal S1, S2] : normal S1, S2 [Normal Gait] : normal gait [No Edema] : no edema [Moves all extremities] : moves all extremities [Normal] : alert and oriented, normal memory

## 2022-01-13 NOTE — HISTORY OF PRESENT ILLNESS
[FreeTextEntry1] : 71 year female who comes for Echo and Stress Test. She saw Dr Moraes and has stopped alcohol with a plan to repeat CBC next month. He BP was elevated with her GYN. She has been feeling anxious

## 2022-01-18 ENCOUNTER — NON-APPOINTMENT (OUTPATIENT)
Age: 72
End: 2022-01-18

## 2022-01-26 ENCOUNTER — APPOINTMENT (OUTPATIENT)
Dept: OPHTHALMOLOGY | Facility: CLINIC | Age: 72
End: 2022-01-26

## 2022-01-26 ENCOUNTER — OUTPATIENT (OUTPATIENT)
Dept: OUTPATIENT SERVICES | Facility: HOSPITAL | Age: 72
LOS: 1 days | End: 2022-01-26

## 2022-01-26 DIAGNOSIS — Z98.890 OTHER SPECIFIED POSTPROCEDURAL STATES: Chronic | ICD-10-CM

## 2022-01-26 DIAGNOSIS — Z41.9 ENCOUNTER FOR PROCEDURE FOR PURPOSES OTHER THAN REMEDYING HEALTH STATE, UNSPECIFIED: Chronic | ICD-10-CM

## 2022-01-27 DIAGNOSIS — H40.1411 CAPSULAR GLAUCOMA WITH PSEUDOEXFOLIATION OF LENS, RIGHT EYE, MILD STAGE: ICD-10-CM

## 2022-03-03 ENCOUNTER — APPOINTMENT (OUTPATIENT)
Dept: HEART AND VASCULAR | Facility: CLINIC | Age: 72
End: 2022-03-03

## 2022-03-03 ENCOUNTER — APPOINTMENT (OUTPATIENT)
Dept: HEART AND VASCULAR | Facility: CLINIC | Age: 72
End: 2022-03-03
Payer: MEDICARE

## 2022-03-03 VITALS
WEIGHT: 102 LBS | HEART RATE: 62 BPM | BODY MASS INDEX: 17.42 KG/M2 | SYSTOLIC BLOOD PRESSURE: 136 MMHG | RESPIRATION RATE: 16 BRPM | TEMPERATURE: 98 F | OXYGEN SATURATION: 100 % | HEIGHT: 64 IN | DIASTOLIC BLOOD PRESSURE: 78 MMHG

## 2022-03-03 DIAGNOSIS — R94.39 ABNORMAL RESULT OF OTHER CARDIOVASCULAR FUNCTION STUDY: ICD-10-CM

## 2022-03-03 PROCEDURE — 93351 STRESS TTE COMPLETE: CPT

## 2022-03-04 PROBLEM — R94.39 EQUIVOCAL STRESS TEST: Status: ACTIVE | Noted: 2022-01-13

## 2022-03-04 NOTE — ASSESSMENT
[FreeTextEntry1] : At the time of the patient's visit a Stress Echocardiogram was performed to evaluate for exercise induced arrhythmia and ischemia. At the time of the visit the results were reviewed with patient \par \par VPCs, ventricular couplets. She cannot tolerate metoprolol. I suggested adding Magnesium Oxide. Labs to be checked after 2 months of Atorvastatin to include CBC, SMA20 and Lipids

## 2022-03-04 NOTE — HISTORY OF PRESENT ILLNESS
[FreeTextEntry1] : 71 year female with palptiations who comes for a stress-echocardiogram. We reveiwed her labs and event recorder. She stopped alcohol and is interested in followup of her MCV

## 2022-04-28 LAB
ALBUMIN SERPL ELPH-MCNC: 4.7 G/DL
ALP BLD-CCNC: 65 U/L
ALT SERPL-CCNC: 20 U/L
ANION GAP SERPL CALC-SCNC: 12 MMOL/L
AST SERPL-CCNC: 25 U/L
BASOPHILS # BLD AUTO: 0.03 K/UL
BASOPHILS NFR BLD AUTO: 0.6 %
BILIRUB SERPL-MCNC: 0.6 MG/DL
BUN SERPL-MCNC: 18 MG/DL
CALCIUM SERPL-MCNC: 9.6 MG/DL
CHLORIDE SERPL-SCNC: 102 MMOL/L
CHOLEST SERPL-MCNC: 168 MG/DL
CO2 SERPL-SCNC: 28 MMOL/L
CREAT SERPL-MCNC: 0.73 MG/DL
EGFR: 87 ML/MIN/1.73M2
EOSINOPHIL # BLD AUTO: 0.14 K/UL
EOSINOPHIL NFR BLD AUTO: 2.6 %
GLUCOSE SERPL-MCNC: 91 MG/DL
HCT VFR BLD CALC: 42.6 %
HDLC SERPL-MCNC: 98 MG/DL
HGB BLD-MCNC: 13.3 G/DL
IMM GRANULOCYTES NFR BLD AUTO: 0.2 %
LDLC SERPL CALC-MCNC: 59 MG/DL
LYMPHOCYTES # BLD AUTO: 1.89 K/UL
LYMPHOCYTES NFR BLD AUTO: 35 %
MAN DIFF?: NORMAL
MCHC RBC-ENTMCNC: 31.2 GM/DL
MCHC RBC-ENTMCNC: 31.8 PG
MCV RBC AUTO: 101.9 FL
MONOCYTES # BLD AUTO: 0.66 K/UL
MONOCYTES NFR BLD AUTO: 12.2 %
NEUTROPHILS # BLD AUTO: 2.67 K/UL
NEUTROPHILS NFR BLD AUTO: 49.4 %
NONHDLC SERPL-MCNC: 71 MG/DL
PLATELET # BLD AUTO: 215 K/UL
POTASSIUM SERPL-SCNC: 5.2 MMOL/L
PROT SERPL-MCNC: 7 G/DL
RBC # BLD: 4.18 M/UL
RBC # FLD: 13.5 %
SODIUM SERPL-SCNC: 141 MMOL/L
TRIGL SERPL-MCNC: 60 MG/DL
WBC # FLD AUTO: 5.4 K/UL

## 2022-06-09 ENCOUNTER — RX RENEWAL (OUTPATIENT)
Age: 72
End: 2022-06-09

## 2022-06-16 ENCOUNTER — APPOINTMENT (OUTPATIENT)
Dept: GASTROENTEROLOGY | Facility: CLINIC | Age: 72
End: 2022-06-16

## 2022-06-23 ENCOUNTER — APPOINTMENT (OUTPATIENT)
Dept: GASTROENTEROLOGY | Facility: CLINIC | Age: 72
End: 2022-06-23
Payer: MEDICARE

## 2022-06-23 VITALS
DIASTOLIC BLOOD PRESSURE: 60 MMHG | HEART RATE: 55 BPM | TEMPERATURE: 98 F | RESPIRATION RATE: 15 BRPM | BODY MASS INDEX: 17.07 KG/M2 | WEIGHT: 100 LBS | OXYGEN SATURATION: 96 % | HEIGHT: 64 IN | SYSTOLIC BLOOD PRESSURE: 107 MMHG

## 2022-06-23 DIAGNOSIS — Z12.11 ENCOUNTER FOR SCREENING FOR MALIGNANT NEOPLASM OF COLON: ICD-10-CM

## 2022-06-23 PROCEDURE — 99204 OFFICE O/P NEW MOD 45 MIN: CPT

## 2022-06-23 RX ORDER — CHLORHEXIDINE GLUCONATE 4 %
400 (240 MG) LIQUID (ML) TOPICAL
Qty: 60 | Refills: 0 | Status: DISCONTINUED | COMMUNITY
Start: 2022-03-03 | End: 2022-06-23

## 2022-06-24 NOTE — HISTORY OF PRESENT ILLNESS
[de-identified] : 72 y/o F with pmhx of mitral valve insufficiency (dx in her 20's), macrocytosis, osteopenia, subdural hematoma 2018 tx at [facility], right eye glaucoma, HSV 2 and HLD here for colon cancer screening.  Pt last c-scope was 3/2010 with Dr. Palmer which revealed one hyperplastic polyp and pt was advised to repeat in 10 years.\par \par Today pt reports struggling with constipation and stools are described as alernating between  bristol stool score 1 and 5.  Admits  daily BM with mild straining without blood nor mucus. Pt states she normally has formed BM but developed small hard stools the past few months. Cannot pinpoint any change in diet or outside stressors.  Pt  has been drinking mineral oil as a laxative supplement for many years now. She tried MiraLAX in the past but developed abdominal discomfort and quickly d/c.  She reports a high fiber diet and drinks at least 1 L fluid daily. \par \par Pt is following with Dr. Moraes for macrocytosis and was advised to d/c alcohol consumption. Pt is also following with Dr. Tellez for MVP. \par \par \par Referred by: Dr. Tellez\par PSHx: Right hip replacement 7/2019 at [facility]\par Meds: See med list \par Allergies: sulfa drugs, fragrance, benzoyl salicylate\par Anticoag/NSAIDS: Denies\par Supplements/OTC: mineral oil \par FH: Patient was adopted and does not recall family hx\par ETOH: 1 beer daily- stopped drinking in 12/2021\par Tobacco: Denies\par Drugs: Denies\par Diet: Well balanced diet- home cooked meals, low fat, whole grain bread, whole wheat pasta, muffin with bran, oatmeal\par Exercise: Yoga, pilates\par \par [place] stool score: 1 and 5\par Colonoscopy: Last one was 2010- WNL\par EGD: Denies\par stool tests: Denies\par Hx of Breath tests: Denies\par \par \par

## 2022-06-24 NOTE — PHYSICAL EXAM
[General Appearance - Alert] : alert [Sclera] : the sclera and conjunctiva were normal [Outer Ear] : the ears and nose were normal in appearance [Neck Appearance] : the appearance of the neck was normal [] : no respiratory distress [Abdomen Soft] : soft [Abnormal Walk] : normal gait [Skin Color & Pigmentation] : normal skin color and pigmentation [Cranial Nerves] : cranial nerves 2-12 were intact [Oriented To Time, Place, And Person] : oriented to person, place, and time

## 2022-06-24 NOTE — REASON FOR VISIT
[Initial Evaluation] : an initial evaluation [FreeTextEntry1] : Colon cancer screening and constipation Nsaids Pregnancy And Lactation Text: These medications are considered safe up to 30 weeks gestation. It is excreted in breast milk.

## 2022-06-24 NOTE — ASSESSMENT
[FreeTextEntry1] : Average risk index screening colonoscopy\par \par -Schedule colonoscopy at [facility]\par -Cardiac clearance required with Dr. Tellez due to MVP \par -Bowel prep provided\par -r/b/a/i discussed and patient agreeable\par -Details of procedure, including risks of procedure which include but not limited to bleeding, perforation, infection, missed polyp discussed and patient gives verbal consent. Written consent to be obtained at time of procedure.\par -Pt was advised that an escort is needed to  from procedure\par -Will f/u post procedure\par \par New onset constipation\par - Pt will start 1/2 packet of Miralax daily and if able to tolerate then will increase to 1 packet daily\par - Recommend pt start Colace 100mg daily in combination with Miralax\par - Advised to d/c mineral oil supplementation as it is not helping to improve her constipation \par - Continue to incorporate fiber in the diet\par - Recommend at least 1L fluid daily \par \par

## 2022-06-28 ENCOUNTER — APPOINTMENT (OUTPATIENT)
Dept: HEART AND VASCULAR | Facility: CLINIC | Age: 72
End: 2022-06-28

## 2022-06-28 VITALS
HEART RATE: 62 BPM | TEMPERATURE: 97.7 F | OXYGEN SATURATION: 99 % | WEIGHT: 100 LBS | BODY MASS INDEX: 17.07 KG/M2 | SYSTOLIC BLOOD PRESSURE: 132 MMHG | HEIGHT: 64 IN | DIASTOLIC BLOOD PRESSURE: 70 MMHG

## 2022-06-28 DIAGNOSIS — A60.00 HERPESVIRAL INFECTION OF UROGENITAL SYSTEM, UNSPECIFIED: ICD-10-CM

## 2022-06-28 DIAGNOSIS — K59.00 CONSTIPATION, UNSPECIFIED: ICD-10-CM

## 2022-06-28 PROCEDURE — 93000 ELECTROCARDIOGRAM COMPLETE: CPT

## 2022-06-28 PROCEDURE — 99213 OFFICE O/P EST LOW 20 MIN: CPT

## 2022-06-28 RX ORDER — VALACYCLOVIR 500 MG/1
500 TABLET, FILM COATED ORAL DAILY
Qty: 5 | Refills: 1 | Status: ACTIVE | COMMUNITY
Start: 2021-03-25

## 2022-06-28 NOTE — HISTORY OF PRESENT ILLNESS
[FreeTextEntry1] : 71 year female who comes for Cardiology clearance prior to colonoscopy with Dr Alva. She denies having any chest pain, SOB, MCDERMOTT, dizziness, palpitations, orthopnea, PND or syncope. She notes feeling lightheaded in humidity as her baseline. She is able to garden including pushing a wheel barrel or walk 2 miles with hills without any symptoms. She notes having constipation

## 2022-06-28 NOTE — ASSESSMENT
[FreeTextEntry1] : An EKG was performed to evaluate for arrhythmia and ischemia. \par \par I informed the patient that I did not find a Cardiovascular contraindication to the colonoscopy at this time. She has been told to take antibiotics for colonoscopy as per her hip surgeon for life

## 2022-07-13 ENCOUNTER — APPOINTMENT (OUTPATIENT)
Dept: HEMATOLOGY ONCOLOGY | Facility: CLINIC | Age: 72
End: 2022-07-13

## 2022-07-13 VITALS
WEIGHT: 99 LBS | BODY MASS INDEX: 16.9 KG/M2 | OXYGEN SATURATION: 99 % | SYSTOLIC BLOOD PRESSURE: 137 MMHG | HEIGHT: 64 IN | DIASTOLIC BLOOD PRESSURE: 74 MMHG | HEART RATE: 70 BPM | TEMPERATURE: 97.8 F

## 2022-07-13 PROCEDURE — 99214 OFFICE O/P EST MOD 30 MIN: CPT | Mod: 25

## 2022-07-13 PROCEDURE — 36415 COLL VENOUS BLD VENIPUNCTURE: CPT

## 2022-08-02 ENCOUNTER — APPOINTMENT (OUTPATIENT)
Dept: HEART AND VASCULAR | Facility: CLINIC | Age: 72
End: 2022-08-02

## 2022-08-04 ENCOUNTER — RESULT REVIEW (OUTPATIENT)
Age: 72
End: 2022-08-04

## 2022-08-04 ENCOUNTER — APPOINTMENT (OUTPATIENT)
Dept: GASTROENTEROLOGY | Facility: CLINIC | Age: 72
End: 2022-08-04

## 2022-08-04 PROCEDURE — 45385 COLONOSCOPY W/LESION REMOVAL: CPT

## 2022-08-04 PROCEDURE — 45380 COLONOSCOPY AND BIOPSY: CPT | Mod: 59

## 2022-08-04 PROCEDURE — 45381 COLONOSCOPY SUBMUCOUS NJX: CPT

## 2022-08-08 LAB
ALBUMIN MFR SERPL ELPH: 62 %
ALBUMIN SERPL-MCNC: 4.8 G/DL
ALBUMIN/GLOB SERPL: 1.6 RATIO
ALPHA1 GLOB MFR SERPL ELPH: 3.7 %
ALPHA1 GLOB SERPL ELPH-MCNC: 0.3 G/DL
ALPHA2 GLOB MFR SERPL ELPH: 9.6 %
ALPHA2 GLOB SERPL ELPH-MCNC: 0.7 G/DL
B-GLOBULIN MFR SERPL ELPH: 9.4 %
B-GLOBULIN SERPL ELPH-MCNC: 0.7 G/DL
BASOPHILS # BLD AUTO: 0.02 K/UL
BASOPHILS NFR BLD AUTO: 0.3 %
DEPRECATED KAPPA LC FREE/LAMBDA SER: 1.6 RATIO
EOSINOPHIL # BLD AUTO: 0.08 K/UL
EOSINOPHIL NFR BLD AUTO: 1.3 %
FOLATE SERPL-MCNC: 14.3 NG/ML
GAMMA GLOB FLD ELPH-MCNC: 1.2 G/DL
GAMMA GLOB MFR SERPL ELPH: 15.3 %
HAPTOGLOB SERPL-MCNC: 119 MG/DL
HCT VFR BLD CALC: 43.2 %
HGB BLD-MCNC: 13.9 G/DL
IGA SER QL IEP: 132 MG/DL
IGG SER QL IEP: 1086 MG/DL
IGM SER QL IEP: 65 MG/DL
IMM GRANULOCYTES NFR BLD AUTO: 0.2 %
INTERPRETATION SERPL IEP-IMP: NORMAL
KAPPA LC CSF-MCNC: 1.12 MG/DL
KAPPA LC SERPL-MCNC: 1.79 MG/DL
LDH SERPL-CCNC: 205 U/L
LYMPHOCYTES # BLD AUTO: 1.53 K/UL
LYMPHOCYTES NFR BLD AUTO: 25 %
M PROTEIN SPEC IFE-MCNC: NORMAL
MAN DIFF?: NORMAL
MCHC RBC-ENTMCNC: 32.2 GM/DL
MCHC RBC-ENTMCNC: 32.5 PG
MCV RBC AUTO: 100.9 FL
MONOCYTES # BLD AUTO: 0.71 K/UL
MONOCYTES NFR BLD AUTO: 11.6 %
NEUTROPHILS # BLD AUTO: 3.77 K/UL
NEUTROPHILS NFR BLD AUTO: 61.6 %
PLATELET # BLD AUTO: 216 K/UL
PROT SERPL-MCNC: 7.8 G/DL
PROT SERPL-MCNC: 7.8 G/DL
RBC # BLD: 4.28 M/UL
RBC # FLD: 13.5 %
VIT B12 SERPL-MCNC: 904 PG/ML
WBC # FLD AUTO: 6.12 K/UL

## 2022-08-08 NOTE — HISTORY OF PRESENT ILLNESS
[de-identified] : 71 years old white female was found to have macrocytosis and was sent here for consultation... Work-up so far shows a normal TSH and normal vitamin B12... Patient admits to EtOH intake of beer a light beer daily as well as a small drink of scotch... Her BMI is 17... [de-identified] : July 13, 2022 patient is here for follow-up on her macrocytosis... She states she had absolutely no alcohol since our last meeting in December... Repeat blood work done by Dr. 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

## 2022-08-08 NOTE — CONSULT LETTER
[Dear  ___] : Dear  [unfilled], [Consult Letter:] : I had the pleasure of evaluating your patient, [unfilled]. [Please see my note below.] : Please see my note below. [Consult Closing:] : Thank you very much for allowing me to participate in the care of this patient.  If you have any questions, please do not hesitate to contact me. [Sincerely,] : Sincerely, [FreeTextEntry3] : Ledya Moraes MD\par

## 2022-08-08 NOTE — ASSESSMENT
[FreeTextEntry1] : Discussed with patient possible causes of macrocytosis... She again  denies taking any other OTC meds... I therefore think that her macrocytosis is secondary to her EtOH intake...\par \par Repeat blood work done today MCV down.\par \par Patient to continue avoiding alcohol and follow-up here as needed.

## 2022-08-16 ENCOUNTER — RESULT REVIEW (OUTPATIENT)
Age: 72
End: 2022-08-16

## 2022-08-16 ENCOUNTER — APPOINTMENT (OUTPATIENT)
Dept: ORTHOPEDIC SURGERY | Facility: CLINIC | Age: 72
End: 2022-08-16

## 2022-08-16 ENCOUNTER — OUTPATIENT (OUTPATIENT)
Dept: OUTPATIENT SERVICES | Facility: HOSPITAL | Age: 72
LOS: 1 days | End: 2022-08-16
Payer: MEDICARE

## 2022-08-16 VITALS — BODY MASS INDEX: 16.9 KG/M2 | WEIGHT: 99 LBS | HEIGHT: 64 IN

## 2022-08-16 DIAGNOSIS — Z41.9 ENCOUNTER FOR PROCEDURE FOR PURPOSES OTHER THAN REMEDYING HEALTH STATE, UNSPECIFIED: Chronic | ICD-10-CM

## 2022-08-16 DIAGNOSIS — M54.50 LOW BACK PAIN, UNSPECIFIED: ICD-10-CM

## 2022-08-16 DIAGNOSIS — Z98.890 OTHER SPECIFIED POSTPROCEDURAL STATES: Chronic | ICD-10-CM

## 2022-08-16 DIAGNOSIS — M85.80 OTHER SPECIFIED DISORDERS OF BONE DENSITY AND STRUCTURE, UNSPECIFIED SITE: ICD-10-CM

## 2022-08-16 PROCEDURE — 72084 X-RAY EXAM ENTIRE SPI 6/> VW: CPT | Mod: 26

## 2022-08-16 PROCEDURE — 72100 X-RAY EXAM L-S SPINE 2/3 VWS: CPT

## 2022-08-16 PROCEDURE — 72082 X-RAY EXAM ENTIRE SPI 2/3 VW: CPT

## 2022-08-16 PROCEDURE — 72084 X-RAY EXAM ENTIRE SPI 6/> VW: CPT

## 2022-08-16 PROCEDURE — 99213 OFFICE O/P EST LOW 20 MIN: CPT

## 2022-08-16 PROCEDURE — 72083 X-RAY EXAM ENTIRE SPI 4/5 VW: CPT

## 2022-08-26 NOTE — HISTORY OF PRESENT ILLNESS
[de-identified] : Initial 8/16/22: This patient reports chronic low back pain for years, which was previously well managed with home exercises and PT. Patient reports this past Friday, 4-5 days ago, she was bending and twisting to pick a flower, and she reports severe left-sided nonradiating low back pain. Pain is moderately improved from onset. She has been taking Tylenol. Patient denies lower extremity numbness, weakness, or paresthesias.

## 2022-08-26 NOTE — DISCUSSION/SUMMARY
[de-identified] : Diagnosis: acute lower back pain, osteopenia.\par \par I discussed my findings with the patient and her . Given the absence of fracture seen on XR today, I have recommended treatment with a Medrol pack, prescription given. Patient will follow up with me in 3-4 weeks if not improved. At that time, if she is not improved, I will also order an MRI Lumbar noncontrast.

## 2022-08-26 NOTE — ADDENDUM
[FreeTextEntry1] : I, Rachel Knapp, assisted with documentation on 08/16/2022 acting as scribe for Dr. Joshua Pierre.

## 2022-08-26 NOTE — PHYSICAL EXAM
[de-identified] : General: patient is well developed, well nourished, in no acute \par distress, alert and oriented x 3. \par \par Mood and affect: normal\par \par Respiratory: no respiratory distress noted\par \par Skin: no scars over spine, skin intact, no erythema, increased warmth\par \par Alignment:The spine is well compensated in the coronal and sagittal plane. \par \par Gait: The patient is able to toe walk and heel walk without difficulty. \par \par Palpation: no tenderness to palpation spine or paraspinal region\par \par Neurologic Exam:\par Motor: Manual Muscle testing in the lower extremities is 5 out of 5 in all muscle groups. There is no evidence of muscular atrophy in the lower extremities. Sensory: Sensation to light touch is grossly intact in the lower extremities\par \par Hip Exam: No pain with internal or external rotation of hips bilaterally\par \par Special tests: Straight leg raise test negative. Cross straight leg test negative.  [de-identified] : XR Full Spine AP/Lateral, Lumbar flexion/extension 8/16/22 [my read]: demonstrates no fractures, no spinal instability, mild disc degeneration low lumbar spine.

## 2022-09-08 ENCOUNTER — OUTPATIENT (OUTPATIENT)
Dept: OUTPATIENT SERVICES | Facility: HOSPITAL | Age: 72
LOS: 1 days | End: 2022-09-08

## 2022-09-08 ENCOUNTER — APPOINTMENT (OUTPATIENT)
Dept: OPHTHALMOLOGY | Facility: CLINIC | Age: 72
End: 2022-09-08

## 2022-09-08 DIAGNOSIS — Z41.9 ENCOUNTER FOR PROCEDURE FOR PURPOSES OTHER THAN REMEDYING HEALTH STATE, UNSPECIFIED: Chronic | ICD-10-CM

## 2022-09-08 DIAGNOSIS — Z98.890 OTHER SPECIFIED POSTPROCEDURAL STATES: Chronic | ICD-10-CM

## 2022-09-09 DIAGNOSIS — H40.033 ANATOMICAL NARROW ANGLE, BILATERAL: ICD-10-CM

## 2022-09-09 DIAGNOSIS — H35.30 UNSPECIFIED MACULAR DEGENERATION: ICD-10-CM

## 2023-03-29 LAB
ALBUMIN SERPL ELPH-MCNC: 4.9 G/DL
ALP BLD-CCNC: 68 U/L
ALT SERPL-CCNC: 22 U/L
ANION GAP SERPL CALC-SCNC: 13 MMOL/L
AST SERPL-CCNC: 26 U/L
BASOPHILS # BLD AUTO: 0.03 K/UL
BASOPHILS NFR BLD AUTO: 0.6 %
BILIRUB SERPL-MCNC: 0.5 MG/DL
BUN SERPL-MCNC: 25 MG/DL
CALCIUM SERPL-MCNC: 9.9 MG/DL
CHLORIDE SERPL-SCNC: 104 MMOL/L
CHOLEST SERPL-MCNC: 184 MG/DL
CO2 SERPL-SCNC: 27 MMOL/L
CREAT SERPL-MCNC: 0.75 MG/DL
EGFR: 83 ML/MIN/1.73M2
EOSINOPHIL # BLD AUTO: 0.11 K/UL
EOSINOPHIL NFR BLD AUTO: 2.2 %
ESTIMATED AVERAGE GLUCOSE: 131 MG/DL
GLUCOSE SERPL-MCNC: 97 MG/DL
HBA1C MFR BLD HPLC: 6.2 %
HCT VFR BLD CALC: 42.1 %
HDLC SERPL-MCNC: 100 MG/DL
HGB BLD-MCNC: 13.4 G/DL
IMM GRANULOCYTES NFR BLD AUTO: 0.4 %
LDLC SERPL CALC-MCNC: 71 MG/DL
LYMPHOCYTES # BLD AUTO: 1.78 K/UL
LYMPHOCYTES NFR BLD AUTO: 35.5 %
MAN DIFF?: NORMAL
MCHC RBC-ENTMCNC: 31.8 GM/DL
MCHC RBC-ENTMCNC: 32.4 PG
MCV RBC AUTO: 101.9 FL
MONOCYTES # BLD AUTO: 0.56 K/UL
MONOCYTES NFR BLD AUTO: 11.2 %
NEUTROPHILS # BLD AUTO: 2.52 K/UL
NEUTROPHILS NFR BLD AUTO: 50.1 %
NONHDLC SERPL-MCNC: 83 MG/DL
PLATELET # BLD AUTO: 204 K/UL
POTASSIUM SERPL-SCNC: 5.2 MMOL/L
PROT SERPL-MCNC: 7.2 G/DL
RBC # BLD: 4.13 M/UL
RBC # FLD: 13.9 %
SODIUM SERPL-SCNC: 143 MMOL/L
TRIGL SERPL-MCNC: 62 MG/DL
TSH SERPL-ACNC: 3.47 UIU/ML
WBC # FLD AUTO: 5.02 K/UL

## 2023-03-30 ENCOUNTER — NON-APPOINTMENT (OUTPATIENT)
Age: 73
End: 2023-03-30

## 2023-03-30 ENCOUNTER — APPOINTMENT (OUTPATIENT)
Dept: HEART AND VASCULAR | Facility: CLINIC | Age: 73
End: 2023-03-30
Payer: MEDICARE

## 2023-03-30 VITALS
HEIGHT: 64 IN | RESPIRATION RATE: 16 BRPM | DIASTOLIC BLOOD PRESSURE: 74 MMHG | WEIGHT: 103 LBS | BODY MASS INDEX: 17.58 KG/M2 | OXYGEN SATURATION: 99 % | HEART RATE: 50 BPM | SYSTOLIC BLOOD PRESSURE: 134 MMHG | TEMPERATURE: 97.4 F

## 2023-03-30 DIAGNOSIS — R00.2 PALPITATIONS: ICD-10-CM

## 2023-03-30 DIAGNOSIS — Z00.00 ENCOUNTER FOR GENERAL ADULT MEDICAL EXAMINATION W/OUT ABNORMAL FINDINGS: ICD-10-CM

## 2023-03-30 PROCEDURE — G0439: CPT

## 2023-03-30 PROCEDURE — 93000 ELECTROCARDIOGRAM COMPLETE: CPT

## 2023-03-30 RX ORDER — METHYLPREDNISOLONE 4 MG/1
4 TABLET ORAL
Qty: 1 | Refills: 0 | Status: DISCONTINUED | COMMUNITY
Start: 2022-08-16 | End: 2023-03-30

## 2023-03-30 RX ORDER — POLYETHYLENE GLYCOL 3350 17 G/17G
17 POWDER, FOR SOLUTION ORAL
Qty: 30 | Refills: 1 | Status: DISCONTINUED | COMMUNITY
Start: 2022-06-23 | End: 2023-03-30

## 2023-03-30 NOTE — HISTORY OF PRESENT ILLNESS
[FreeTextEntry1] : 72 year female who comes for annual exam. She is up to date with Covid and Flu Vax. She had Prevnar and is uncertain about Pneumovax 23. She denies having any chest pain, SOB, MCDERMOTT, dizziness, orthopnea, PND or syncope. She describes intermittent heart pounding

## 2023-03-30 NOTE — ASSESSMENT
[FreeTextEntry1] : An EKG was performed to evaluate for arrhythmia and ischemia.\par \par We reviewed her labs in detail. She continues to abstain from alcohol. We discussed that her BMI is normal. She is working with Dr Rivers regarding her elevated Hgba1c\par \par Palpitations-- event recorder discussed. She preferred to wait until after a repeat Echo to decide\par \par MR-- I asked her to return for an Echocardiogram\par \par Preventive age appropriate cancer screening reviewed and followup suggested.\par \par Depression and anxiety --Patient screened for depression and denies having any depression or anxiety liming his ability to function in life \par \par I encouraged continued risk factor reduction and gradual increase in aerobic activity as tolerated\par \par 30   minutes were spent discussing cardiac risk excluding procedure time

## 2023-07-11 ENCOUNTER — NON-APPOINTMENT (OUTPATIENT)
Age: 73
End: 2023-07-11

## 2023-07-11 ENCOUNTER — APPOINTMENT (OUTPATIENT)
Dept: HEART AND VASCULAR | Facility: CLINIC | Age: 73
End: 2023-07-11
Payer: MEDICARE

## 2023-07-11 VITALS — SYSTOLIC BLOOD PRESSURE: 130 MMHG | DIASTOLIC BLOOD PRESSURE: 80 MMHG

## 2023-07-11 VITALS
SYSTOLIC BLOOD PRESSURE: 108 MMHG | HEART RATE: 72 BPM | OXYGEN SATURATION: 99 % | HEIGHT: 64.75 IN | BODY MASS INDEX: 17.43 KG/M2 | DIASTOLIC BLOOD PRESSURE: 68 MMHG | WEIGHT: 103.37 LBS

## 2023-07-11 VITALS
WEIGHT: 105 LBS | OXYGEN SATURATION: 97 % | TEMPERATURE: 98.2 F | DIASTOLIC BLOOD PRESSURE: 72 MMHG | BODY MASS INDEX: 17.71 KG/M2 | HEIGHT: 64.75 IN | RESPIRATION RATE: 16 BRPM | HEART RATE: 60 BPM | SYSTOLIC BLOOD PRESSURE: 140 MMHG

## 2023-07-11 DIAGNOSIS — I34.0 NONRHEUMATIC MITRAL (VALVE) INSUFFICIENCY: ICD-10-CM

## 2023-07-11 PROCEDURE — 93000 ELECTROCARDIOGRAM COMPLETE: CPT

## 2023-07-11 PROCEDURE — 99213 OFFICE O/P EST LOW 20 MIN: CPT

## 2023-07-11 PROCEDURE — 93306 TTE W/DOPPLER COMPLETE: CPT

## 2023-07-11 NOTE — HISTORY OF PRESENT ILLNESS
[FreeTextEntry1] : 72 year female who comes for followup of her MR. She notes feeling lightheaded in hot weather. She drinks more and moves slower but returns to baseline when the weather improves. She denies having any chest pain, SOB, MCDERMOTT, dizziness, palpitations, orthopnea, PND or syncope

## 2023-07-11 NOTE — ASSESSMENT
[FreeTextEntry1] : An EKG was performed to evaluate for arrhythmia and ischemia.\par \par At the time of the patient's visit an Echocardiogram was performed to evaluate LV function. At the time of the visit the results were reviewed with patient\par \par nonrheumatic MR\par \par I suggested that she have labs at [facility] in September and return in the Fall\par \par I encouraged continued risk factor reduction and gradual increase in aerobic activity as tolerated\par \par 28   minutes were spent discussing cardiac risk excluding procedure time

## 2023-10-03 LAB
ALBUMIN SERPL ELPH-MCNC: 4.6 G/DL
ALP BLD-CCNC: 63 U/L
ALT SERPL-CCNC: 23 U/L
ANION GAP SERPL CALC-SCNC: 13 MMOL/L
AST SERPL-CCNC: 25 U/L
BILIRUB SERPL-MCNC: 0.5 MG/DL
BUN SERPL-MCNC: 22 MG/DL
CALCIUM SERPL-MCNC: 9.7 MG/DL
CHLORIDE SERPL-SCNC: 103 MMOL/L
CHOLEST SERPL-MCNC: 177 MG/DL
CO2 SERPL-SCNC: 26 MMOL/L
CREAT SERPL-MCNC: 0.72 MG/DL
EGFR: 87 ML/MIN/1.73M2
ESTIMATED AVERAGE GLUCOSE: 126 MG/DL
FOLATE SERPL-MCNC: 13.3 NG/ML
GLUCOSE SERPL-MCNC: 89 MG/DL
HBA1C MFR BLD HPLC: 6 %
HDLC SERPL-MCNC: 102 MG/DL
LDLC SERPL CALC-MCNC: 64 MG/DL
NONHDLC SERPL-MCNC: 76 MG/DL
POTASSIUM SERPL-SCNC: 4.9 MMOL/L
PROT SERPL-MCNC: 7.1 G/DL
SODIUM SERPL-SCNC: 143 MMOL/L
TRIGL SERPL-MCNC: 59 MG/DL
TSH SERPL-ACNC: 3.78 UIU/ML
VIT B12 SERPL-MCNC: 1114 PG/ML

## 2023-10-25 ENCOUNTER — OUTPATIENT (OUTPATIENT)
Dept: OUTPATIENT SERVICES | Facility: HOSPITAL | Age: 73
LOS: 1 days | End: 2023-10-25

## 2023-10-25 ENCOUNTER — APPOINTMENT (OUTPATIENT)
Dept: OPHTHALMOLOGY | Facility: CLINIC | Age: 73
End: 2023-10-25

## 2023-10-25 DIAGNOSIS — Z41.9 ENCOUNTER FOR PROCEDURE FOR PURPOSES OTHER THAN REMEDYING HEALTH STATE, UNSPECIFIED: Chronic | ICD-10-CM

## 2023-10-25 DIAGNOSIS — Z98.890 OTHER SPECIFIED POSTPROCEDURAL STATES: Chronic | ICD-10-CM

## 2023-10-31 DIAGNOSIS — H35.373 PUCKERING OF MACULA, BILATERAL: ICD-10-CM

## 2023-10-31 DIAGNOSIS — H40.033 ANATOMICAL NARROW ANGLE, BILATERAL: ICD-10-CM

## 2023-11-03 ENCOUNTER — OUTPATIENT (OUTPATIENT)
Dept: OUTPATIENT SERVICES | Facility: HOSPITAL | Age: 73
LOS: 1 days | End: 2023-11-03
Payer: MEDICARE

## 2023-11-03 ENCOUNTER — RESULT REVIEW (OUTPATIENT)
Age: 73
End: 2023-11-03

## 2023-11-03 ENCOUNTER — APPOINTMENT (OUTPATIENT)
Dept: ORTHOPEDIC SURGERY | Facility: CLINIC | Age: 73
End: 2023-11-03
Payer: MEDICARE

## 2023-11-03 VITALS
HEIGHT: 64.75 IN | DIASTOLIC BLOOD PRESSURE: 74 MMHG | HEART RATE: 67 BPM | SYSTOLIC BLOOD PRESSURE: 134 MMHG | OXYGEN SATURATION: 98 % | WEIGHT: 105 LBS | BODY MASS INDEX: 17.71 KG/M2

## 2023-11-03 DIAGNOSIS — M77.41 METATARSALGIA, RIGHT FOOT: ICD-10-CM

## 2023-11-03 DIAGNOSIS — Z98.890 OTHER SPECIFIED POSTPROCEDURAL STATES: Chronic | ICD-10-CM

## 2023-11-03 DIAGNOSIS — Z41.9 ENCOUNTER FOR PROCEDURE FOR PURPOSES OTHER THAN REMEDYING HEALTH STATE, UNSPECIFIED: Chronic | ICD-10-CM

## 2023-11-03 DIAGNOSIS — Z96.641 AFTERCARE FOLLOWING JOINT REPLACEMENT SURGERY: ICD-10-CM

## 2023-11-03 DIAGNOSIS — M46.1 SACROILIITIS, NOT ELSEWHERE CLASSIFIED: ICD-10-CM

## 2023-11-03 DIAGNOSIS — G62.9 POLYNEUROPATHY, UNSPECIFIED: ICD-10-CM

## 2023-11-03 DIAGNOSIS — Z47.1 AFTERCARE FOLLOWING JOINT REPLACEMENT SURGERY: ICD-10-CM

## 2023-11-03 PROCEDURE — 73521 X-RAY EXAM HIPS BI 2 VIEWS: CPT | Mod: 26

## 2023-11-03 PROCEDURE — 99214 OFFICE O/P EST MOD 30 MIN: CPT

## 2023-11-03 PROCEDURE — 73521 X-RAY EXAM HIPS BI 2 VIEWS: CPT

## 2023-11-06 ENCOUNTER — APPOINTMENT (OUTPATIENT)
Dept: ENDOCRINOLOGY | Facility: CLINIC | Age: 73
End: 2023-11-06
Payer: MEDICARE

## 2023-11-06 VITALS
SYSTOLIC BLOOD PRESSURE: 103 MMHG | WEIGHT: 104.38 LBS | BODY MASS INDEX: 17.39 KG/M2 | DIASTOLIC BLOOD PRESSURE: 63 MMHG | HEART RATE: 64 BPM | HEIGHT: 65 IN

## 2023-11-06 PROCEDURE — 99213 OFFICE O/P EST LOW 20 MIN: CPT

## 2023-11-06 RX ORDER — ESTROGENS, CONJUGATED 0.45 MG/1
TABLET, FILM COATED ORAL
Refills: 0 | Status: DISCONTINUED | COMMUNITY
End: 2023-11-06

## 2023-11-08 ENCOUNTER — NON-APPOINTMENT (OUTPATIENT)
Age: 73
End: 2023-11-08

## 2023-11-08 ENCOUNTER — APPOINTMENT (OUTPATIENT)
Dept: HEART AND VASCULAR | Facility: CLINIC | Age: 73
End: 2023-11-08
Payer: MEDICARE

## 2023-11-08 VITALS
DIASTOLIC BLOOD PRESSURE: 80 MMHG | TEMPERATURE: 97.5 F | HEART RATE: 69 BPM | BODY MASS INDEX: 17.55 KG/M2 | HEIGHT: 64.5 IN | SYSTOLIC BLOOD PRESSURE: 120 MMHG | OXYGEN SATURATION: 97 % | RESPIRATION RATE: 16 BRPM | WEIGHT: 104.04 LBS

## 2023-11-08 DIAGNOSIS — D75.89 OTHER SPECIFIED DISEASES OF BLOOD AND BLOOD-FORMING ORGANS: ICD-10-CM

## 2023-11-08 DIAGNOSIS — R73.09 OTHER ABNORMAL GLUCOSE: ICD-10-CM

## 2023-11-08 DIAGNOSIS — Z78.9 OTHER SPECIFIED HEALTH STATUS: ICD-10-CM

## 2023-11-08 PROCEDURE — 99214 OFFICE O/P EST MOD 30 MIN: CPT

## 2023-11-08 PROCEDURE — 93000 ELECTROCARDIOGRAM COMPLETE: CPT

## 2023-11-09 PROBLEM — D75.89 MACROCYTOSIS: Status: ACTIVE | Noted: 2018-04-11

## 2024-02-20 ENCOUNTER — RX RENEWAL (OUTPATIENT)
Age: 74
End: 2024-02-20

## 2024-02-26 ENCOUNTER — APPOINTMENT (OUTPATIENT)
Dept: HEART AND VASCULAR | Facility: CLINIC | Age: 74
End: 2024-02-26
Payer: MEDICARE

## 2024-02-26 ENCOUNTER — NON-APPOINTMENT (OUTPATIENT)
Age: 74
End: 2024-02-26

## 2024-02-26 ENCOUNTER — LABORATORY RESULT (OUTPATIENT)
Age: 74
End: 2024-02-26

## 2024-02-26 VITALS
HEART RATE: 58 BPM | TEMPERATURE: 97.8 F | DIASTOLIC BLOOD PRESSURE: 89 MMHG | HEIGHT: 64.25 IN | SYSTOLIC BLOOD PRESSURE: 119 MMHG

## 2024-02-26 PROCEDURE — 93000 ELECTROCARDIOGRAM COMPLETE: CPT | Mod: NC

## 2024-02-26 PROCEDURE — 36415 COLL VENOUS BLD VENIPUNCTURE: CPT

## 2024-02-26 PROCEDURE — G2211 COMPLEX E/M VISIT ADD ON: CPT

## 2024-02-26 PROCEDURE — 99214 OFFICE O/P EST MOD 30 MIN: CPT

## 2024-02-26 RX ORDER — MELOXICAM 7.5 MG/1
7.5 TABLET ORAL DAILY
Qty: 30 | Refills: 2 | Status: DISCONTINUED | COMMUNITY
Start: 2023-11-03 | End: 2024-02-26

## 2024-02-26 RX ORDER — ATORVASTATIN CALCIUM 20 MG/1
20 TABLET, FILM COATED ORAL
Qty: 90 | Refills: 3 | Status: ACTIVE | COMMUNITY
Start: 2022-01-18 | End: 1900-01-01

## 2024-02-27 LAB
ALBUMIN SERPL ELPH-MCNC: 4.7 G/DL
ALP BLD-CCNC: 66 U/L
ALT SERPL-CCNC: 22 U/L
ANION GAP SERPL CALC-SCNC: 17 MMOL/L
APPEARANCE: CLEAR
AST SERPL-CCNC: 27 U/L
BASOPHILS # BLD AUTO: 0.03 K/UL
BASOPHILS NFR BLD AUTO: 0.4 %
BILIRUB SERPL-MCNC: 0.3 MG/DL
BILIRUBIN URINE: NEGATIVE
BLOOD URINE: ABNORMAL
BUN SERPL-MCNC: 26 MG/DL
CALCIUM SERPL-MCNC: 9.9 MG/DL
CHLORIDE SERPL-SCNC: 101 MMOL/L
CO2 SERPL-SCNC: 23 MMOL/L
COLOR: YELLOW
CREAT SERPL-MCNC: 0.75 MG/DL
EGFR: 83 ML/MIN/1.73M2
EOSINOPHIL # BLD AUTO: 0.1 K/UL
EOSINOPHIL NFR BLD AUTO: 1.4 %
GLUCOSE QUALITATIVE U: NEGATIVE MG/DL
GLUCOSE SERPL-MCNC: 95 MG/DL
HCT VFR BLD CALC: 41 %
HGB BLD-MCNC: 13.3 G/DL
IMM GRANULOCYTES NFR BLD AUTO: 0.3 %
KETONES URINE: NEGATIVE MG/DL
LEUKOCYTE ESTERASE URINE: ABNORMAL
LYMPHOCYTES # BLD AUTO: 1.65 K/UL
LYMPHOCYTES NFR BLD AUTO: 23.7 %
MAN DIFF?: NORMAL
MCHC RBC-ENTMCNC: 32.4 GM/DL
MCHC RBC-ENTMCNC: 32.8 PG
MCV RBC AUTO: 101 FL
MONOCYTES # BLD AUTO: 0.77 K/UL
MONOCYTES NFR BLD AUTO: 11.1 %
NEUTROPHILS # BLD AUTO: 4.39 K/UL
NEUTROPHILS NFR BLD AUTO: 63.1 %
NITRITE URINE: POSITIVE
PH URINE: 5.5
PLATELET # BLD AUTO: 211 K/UL
POTASSIUM SERPL-SCNC: 4.5 MMOL/L
PROT SERPL-MCNC: 7.1 G/DL
PROTEIN URINE: NEGATIVE MG/DL
RBC # BLD: 4.06 M/UL
RBC # FLD: 14.1 %
SODIUM SERPL-SCNC: 140 MMOL/L
SPECIFIC GRAVITY URINE: 1.02
UROBILINOGEN URINE: 0.2 MG/DL
WBC # FLD AUTO: 6.96 K/UL

## 2024-02-29 ENCOUNTER — NON-APPOINTMENT (OUTPATIENT)
Age: 74
End: 2024-02-29

## 2024-02-29 DIAGNOSIS — N39.0 URINARY TRACT INFECTION, SITE NOT SPECIFIED: ICD-10-CM

## 2024-02-29 RX ORDER — AMOXICILLIN AND CLAVULANATE POTASSIUM 875; 125 MG/1; MG/1
875-125 TABLET, COATED ORAL TWICE DAILY
Qty: 14 | Refills: 0 | Status: DISCONTINUED | COMMUNITY
Start: 2024-02-28 | End: 2024-02-29

## 2024-03-07 ENCOUNTER — APPOINTMENT (OUTPATIENT)
Dept: HEART AND VASCULAR | Facility: CLINIC | Age: 74
End: 2024-03-07

## 2024-05-20 ENCOUNTER — NON-APPOINTMENT (OUTPATIENT)
Age: 74
End: 2024-05-20

## 2024-05-20 ENCOUNTER — APPOINTMENT (OUTPATIENT)
Dept: HEART AND VASCULAR | Facility: CLINIC | Age: 74
End: 2024-05-20
Payer: MEDICARE

## 2024-05-20 VITALS
BODY MASS INDEX: 17.42 KG/M2 | RESPIRATION RATE: 16 BRPM | HEART RATE: 79 BPM | TEMPERATURE: 97.8 F | WEIGHT: 102 LBS | HEIGHT: 64.25 IN | DIASTOLIC BLOOD PRESSURE: 66 MMHG | SYSTOLIC BLOOD PRESSURE: 112 MMHG | OXYGEN SATURATION: 99 %

## 2024-05-20 DIAGNOSIS — Z01.818 ENCOUNTER FOR OTHER PREPROCEDURAL EXAMINATION: ICD-10-CM

## 2024-05-20 DIAGNOSIS — E78.5 HYPERLIPIDEMIA, UNSPECIFIED: ICD-10-CM

## 2024-05-20 PROCEDURE — 99214 OFFICE O/P EST MOD 30 MIN: CPT

## 2024-05-20 PROCEDURE — G2211 COMPLEX E/M VISIT ADD ON: CPT

## 2024-05-20 PROCEDURE — 36415 COLL VENOUS BLD VENIPUNCTURE: CPT

## 2024-05-20 PROCEDURE — 93000 ELECTROCARDIOGRAM COMPLETE: CPT | Mod: NC

## 2024-05-20 RX ORDER — BRINZOLAMIDE 10 MG/ML
1 SUSPENSION/ DROPS OPHTHALMIC
Qty: 30 | Refills: 0 | Status: DISCONTINUED | COMMUNITY
Start: 2022-01-26 | End: 2024-05-20

## 2024-05-20 RX ORDER — TOBRAMYCIN AND DEXAMETHASONE 3; 1 MG/G; MG/G
0.3-0.1 OINTMENT OPHTHALMIC
Qty: 1 | Refills: 0 | Status: ACTIVE | COMMUNITY
Start: 2024-05-20

## 2024-05-20 RX ORDER — NITROFURANTOIN (MONOHYDRATE/MACROCRYSTALS) 25; 75 MG/1; MG/1
100 CAPSULE ORAL
Qty: 14 | Refills: 0 | Status: DISCONTINUED | COMMUNITY
Start: 2024-02-29 | End: 2024-05-20

## 2024-05-20 NOTE — HISTORY OF PRESENT ILLNESS
[FreeTextEntry1] : 73 year female who comes for preop evaluation prior to left cataract surgery on June 5, 2024. She denies having any chest pain, SOB, MCDERMOTT, dizziness, palpitations, orthopnea, PND or syncope. She is able to climb stairs, garden and carry a wheel [place] without any symptoms

## 2024-05-20 NOTE — ASSESSMENT
[FreeTextEntry1] : An EKG was performed to evaluate for arrhythmia and ischemia.  Labs were drawn in the office and sent  I informed the patient that pending results of her preop labs  I did not find a Medical or Cardiovascular contraindication to the proposed surgery at this time   I encouraged continued risk factor reduction and gradual increase in aerobic activity as tolerated   31  minutes were spent discussing cardiac risk excluding procedure time

## 2024-05-20 NOTE — REVIEW OF SYSTEMS
[Blurry Vision] : blurred vision [Negative] : Heme/Lymph [Hearing Loss] : no hearing loss [Tinnitus] : no tinnitus [Dizziness] : no dizziness [Convulsions] : no convulsions [Confusion] : no confusion was observed [Depression] : no depression [Anxiety] : no anxiety [Suicidal] : not suicidal

## 2024-05-21 LAB
ALBUMIN SERPL ELPH-MCNC: 4.8 G/DL
ALP BLD-CCNC: 71 U/L
ALT SERPL-CCNC: 21 U/L
ANION GAP SERPL CALC-SCNC: 11 MMOL/L
APPEARANCE: CLEAR
AST SERPL-CCNC: 24 U/L
BASOPHILS # BLD AUTO: 0.02 K/UL
BASOPHILS NFR BLD AUTO: 0.3 %
BILIRUB SERPL-MCNC: 0.3 MG/DL
BILIRUBIN URINE: NEGATIVE
BLOOD URINE: NEGATIVE
BUN SERPL-MCNC: 27 MG/DL
CALCIUM SERPL-MCNC: 10 MG/DL
CHLORIDE SERPL-SCNC: 100 MMOL/L
CO2 SERPL-SCNC: 29 MMOL/L
COLOR: YELLOW
CREAT SERPL-MCNC: 0.71 MG/DL
EGFR: 89 ML/MIN/1.73M2
EOSINOPHIL # BLD AUTO: 0.11 K/UL
EOSINOPHIL NFR BLD AUTO: 1.9 %
GLUCOSE QUALITATIVE U: NEGATIVE MG/DL
GLUCOSE SERPL-MCNC: 126 MG/DL
HCT VFR BLD CALC: 40.6 %
HGB BLD-MCNC: 13.3 G/DL
IMM GRANULOCYTES NFR BLD AUTO: 0.2 %
KETONES URINE: NEGATIVE MG/DL
LEUKOCYTE ESTERASE URINE: NEGATIVE
LYMPHOCYTES # BLD AUTO: 1.4 K/UL
LYMPHOCYTES NFR BLD AUTO: 23.8 %
MAN DIFF?: NORMAL
MCHC RBC-ENTMCNC: 32.8 GM/DL
MCHC RBC-ENTMCNC: 33.3 PG
MCV RBC AUTO: 101.8 FL
MONOCYTES # BLD AUTO: 0.63 K/UL
MONOCYTES NFR BLD AUTO: 10.7 %
NEUTROPHILS # BLD AUTO: 3.72 K/UL
NEUTROPHILS NFR BLD AUTO: 63.1 %
NITRITE URINE: NEGATIVE
PH URINE: 5.5
PLATELET # BLD AUTO: 194 K/UL
POTASSIUM SERPL-SCNC: 4.9 MMOL/L
PROT SERPL-MCNC: 7.2 G/DL
PROTEIN URINE: NEGATIVE MG/DL
RBC # BLD: 3.99 M/UL
RBC # FLD: 13.9 %
SODIUM SERPL-SCNC: 140 MMOL/L
SPECIFIC GRAVITY URINE: 1.02
UROBILINOGEN URINE: 0.2 MG/DL
WBC # FLD AUTO: 5.89 K/UL

## 2024-08-01 ENCOUNTER — NON-APPOINTMENT (OUTPATIENT)
Age: 74
End: 2024-08-01

## 2024-08-02 ENCOUNTER — APPOINTMENT (OUTPATIENT)
Dept: NEUROLOGY | Facility: CLINIC | Age: 74
End: 2024-08-02
Payer: MEDICARE

## 2024-08-02 ENCOUNTER — NON-APPOINTMENT (OUTPATIENT)
Age: 74
End: 2024-08-02

## 2024-08-02 VITALS
HEART RATE: 69 BPM | DIASTOLIC BLOOD PRESSURE: 79 MMHG | BODY MASS INDEX: 17.24 KG/M2 | SYSTOLIC BLOOD PRESSURE: 129 MMHG | TEMPERATURE: 97.3 F | HEIGHT: 64.25 IN | WEIGHT: 101 LBS

## 2024-08-02 DIAGNOSIS — R20.0 ANESTHESIA OF SKIN: ICD-10-CM

## 2024-08-02 PROCEDURE — 99203 OFFICE O/P NEW LOW 30 MIN: CPT

## 2024-08-02 NOTE — PHYSICAL EXAM
[FreeTextEntry1] : Motor: 5/5 strength throughout Sensory: vibration, light touch, pinprick intact and symmetric in lower extremities bilaterally including no difference between 3rd and 4th toes and other toes b/l  Reflexes: 2+ symmetric throughout

## 2024-08-02 NOTE — HISTORY OF PRESENT ILLNESS
[FreeTextEntry1] : Referred by Dr. Tellez for numbness in her toes - started in the right 3rd and 4th toes, then last year felt it in the left 3rd and 4th toes.  She saw podiatry Dr. Reid who thought she might have neuropathy She denies tingling, burning sensations  She previously saw Dr. Pierre for sudden intense back pain in 2022, no radiating pain into the legs   Reviewed: Labs below Notes from orthopedics, cardiology

## 2024-08-02 NOTE — ASSESSMENT
[FreeTextEntry1] : Normal neurologic exam including sensation on 3rd and 4th toes b/l - no evidence of peripheral neuropathy Symptoms are not consistent with lumbar radiculopathy No further neurologic workup is recommended at this time Return as needed

## 2024-08-02 NOTE — CONSULT LETTER
[Dear  ___] : Dear  [unfilled], [Consult Letter:] : I had the pleasure of evaluating your patient, [unfilled]. [Please see my note below.] : Please see my note below. [Consult Closing:] : Thank you very much for allowing me to participate in the care of this patient.  If you have any questions, please do not hesitate to contact me. [Sincerely,] : Sincerely, [FreeTextEntry3] : Mario Gardner M.D. Neurology, Electromyography and Neuromuscular Medicine [facility]   of Neurology [facility] / [facility] [DrJose De Jesus  ___] : Dr. HALL

## 2024-08-21 ENCOUNTER — APPOINTMENT (OUTPATIENT)
Dept: HEART AND VASCULAR | Facility: CLINIC | Age: 74
End: 2024-08-21

## 2024-09-11 ENCOUNTER — LABORATORY RESULT (OUTPATIENT)
Age: 74
End: 2024-09-11

## 2024-09-12 LAB
ALBUMIN SERPL ELPH-MCNC: 4.8 G/DL
ALP BLD-CCNC: 69 U/L
ALT SERPL-CCNC: 25 U/L
ANION GAP SERPL CALC-SCNC: 11 MMOL/L
AST SERPL-CCNC: 29 U/L
BASOPHILS # BLD AUTO: 0.03 K/UL
BASOPHILS NFR BLD AUTO: 0.6 %
BILIRUB SERPL-MCNC: 0.5 MG/DL
BUN SERPL-MCNC: 22 MG/DL
CALCIUM SERPL-MCNC: 9.9 MG/DL
CHLORIDE SERPL-SCNC: 101 MMOL/L
CHOLEST SERPL-MCNC: 175 MG/DL
CO2 SERPL-SCNC: 29 MMOL/L
CREAT SERPL-MCNC: 0.77 MG/DL
EGFR: 80 ML/MIN/1.73M2
EOSINOPHIL # BLD AUTO: 0.16 K/UL
EOSINOPHIL NFR BLD AUTO: 3 %
ESTIMATED AVERAGE GLUCOSE: 123 MG/DL
FOLATE SERPL-MCNC: 9.3 NG/ML
GLUCOSE SERPL-MCNC: 95 MG/DL
HBA1C MFR BLD HPLC: 5.9 %
HCT VFR BLD CALC: 42.7 %
HDLC SERPL-MCNC: 101 MG/DL
HGB BLD-MCNC: 13.7 G/DL
IMM GRANULOCYTES NFR BLD AUTO: 0.4 %
LDLC SERPL CALC-MCNC: 63 MG/DL
LYMPHOCYTES # BLD AUTO: 1.88 K/UL
LYMPHOCYTES NFR BLD AUTO: 34.9 %
MAN DIFF?: NORMAL
MCHC RBC-ENTMCNC: 32.1 GM/DL
MCHC RBC-ENTMCNC: 32.6 PG
MCV RBC AUTO: 101.7 FL
MONOCYTES # BLD AUTO: 0.53 K/UL
MONOCYTES NFR BLD AUTO: 9.8 %
NEUTROPHILS # BLD AUTO: 2.77 K/UL
NEUTROPHILS NFR BLD AUTO: 51.3 %
NONHDLC SERPL-MCNC: 74 MG/DL
PLATELET # BLD AUTO: 194 K/UL
POTASSIUM SERPL-SCNC: 5.7 MMOL/L
PROT SERPL-MCNC: 7.3 G/DL
RBC # BLD: 4.2 M/UL
RBC # FLD: 13.7 %
SODIUM SERPL-SCNC: 142 MMOL/L
TRIGL SERPL-MCNC: 58 MG/DL
TSH SERPL-ACNC: 3.78 UIU/ML
VIT B12 SERPL-MCNC: 1056 PG/ML
WBC # FLD AUTO: 5.39 K/UL

## 2024-09-23 ENCOUNTER — APPOINTMENT (OUTPATIENT)
Dept: HEART AND VASCULAR | Facility: CLINIC | Age: 74
End: 2024-09-23
Payer: MEDICARE

## 2024-09-23 ENCOUNTER — NON-APPOINTMENT (OUTPATIENT)
Age: 74
End: 2024-09-23

## 2024-09-23 VITALS
BODY MASS INDEX: 17.75 KG/M2 | DIASTOLIC BLOOD PRESSURE: 72 MMHG | TEMPERATURE: 98.1 F | RESPIRATION RATE: 16 BRPM | WEIGHT: 104 LBS | HEIGHT: 64.25 IN | SYSTOLIC BLOOD PRESSURE: 122 MMHG | OXYGEN SATURATION: 98 % | HEART RATE: 66 BPM

## 2024-09-23 DIAGNOSIS — E87.5 HYPERKALEMIA: ICD-10-CM

## 2024-09-23 DIAGNOSIS — E78.5 HYPERLIPIDEMIA, UNSPECIFIED: ICD-10-CM

## 2024-09-23 PROCEDURE — 93000 ELECTROCARDIOGRAM COMPLETE: CPT

## 2024-09-23 PROCEDURE — G2211 COMPLEX E/M VISIT ADD ON: CPT

## 2024-09-23 PROCEDURE — 99214 OFFICE O/P EST MOD 30 MIN: CPT

## 2024-09-23 NOTE — HISTORY OF PRESENT ILLNESS
[FreeTextEntry1] : 74 year female with hyperlipidemia. We reviewed recent labs. She denies having any supplements with potassium. She denies having any chest pain, SOB, MCDERMOTT, dizziness, palpitations, orthopnea, PND or syncope. She sees Endo for osteoporosis. She was evaluated for neuropathy which was normal.

## 2024-09-23 NOTE — ASSESSMENT
[FreeTextEntry1] : An EKG was performed to evaluate for arrhythmia and ischemia.  hx of MR and hyperlipidemia-- followup for stress-echocardiogram  hyperlipidemia-- continue statin  hyperkalemia-- no EKG abnormalities. Suggest repeat. She prefers to wait and have drawn at lab  elevated [id number]--1 beer since January 2024. We discussed benefit of meeting a nutritionist. She plans follow up with Endo  Meet new PCP/Geriatrician  I encouraged continued risk factor reduction and gradual increase in aerobic activity as tolerated  32   minutes were spent discussing cardiac risk excluding procedure time

## 2024-11-18 ENCOUNTER — APPOINTMENT (OUTPATIENT)
Dept: GERIATRICS | Facility: CLINIC | Age: 74
End: 2024-11-18
Payer: MEDICARE

## 2024-11-18 VITALS
WEIGHT: 103.5 LBS | HEART RATE: 64 BPM | DIASTOLIC BLOOD PRESSURE: 79 MMHG | SYSTOLIC BLOOD PRESSURE: 134 MMHG | TEMPERATURE: 98.4 F | BODY MASS INDEX: 17.67 KG/M2 | HEIGHT: 64.25 IN | OXYGEN SATURATION: 96 %

## 2024-11-18 DIAGNOSIS — A60.00 HERPESVIRAL INFECTION OF UROGENITAL SYSTEM, UNSPECIFIED: ICD-10-CM

## 2024-11-18 DIAGNOSIS — R94.39 ABNORMAL RESULT OF OTHER CARDIOVASCULAR FUNCTION STUDY: ICD-10-CM

## 2024-11-18 DIAGNOSIS — G89.29 PAIN IN LEFT HIP: ICD-10-CM

## 2024-11-18 DIAGNOSIS — M85.80 OTHER SPECIFIED DISORDERS OF BONE DENSITY AND STRUCTURE, UNSPECIFIED SITE: ICD-10-CM

## 2024-11-18 DIAGNOSIS — Z86.69 PERSONAL HISTORY OF OTHER DISEASES OF THE NERVOUS SYSTEM AND SENSE ORGANS: ICD-10-CM

## 2024-11-18 DIAGNOSIS — E78.5 HYPERLIPIDEMIA, UNSPECIFIED: ICD-10-CM

## 2024-11-18 DIAGNOSIS — M54.50 LOW BACK PAIN, UNSPECIFIED: ICD-10-CM

## 2024-11-18 DIAGNOSIS — E87.5 HYPERKALEMIA: ICD-10-CM

## 2024-11-18 DIAGNOSIS — M25.552 PAIN IN LEFT HIP: ICD-10-CM

## 2024-11-18 DIAGNOSIS — Z01.89 ENCOUNTER FOR OTHER SPECIFIED SPECIAL EXAMINATIONS: ICD-10-CM

## 2024-11-18 PROCEDURE — 99205 OFFICE O/P NEW HI 60 MIN: CPT

## 2024-11-19 PROBLEM — Z86.69 H/O IMPACTED CERUMEN: Status: ACTIVE | Noted: 2024-11-19

## 2024-11-19 PROBLEM — Z01.89 ENCOUNTER FOR GERIATRIC ASSESSMENT: Status: ACTIVE | Noted: 2024-11-19

## 2024-11-19 RX ORDER — CONJUGATED ESTROGENS 0.62 MG/G
0.62 CREAM VAGINAL
Refills: 0 | Status: ACTIVE | COMMUNITY
Start: 2024-11-19

## 2024-11-26 LAB — 25(OH)D3 SERPL-MCNC: 51.3 NG/ML

## 2025-02-06 ENCOUNTER — RX RENEWAL (OUTPATIENT)
Age: 75
End: 2025-02-06

## 2025-03-11 ENCOUNTER — APPOINTMENT (OUTPATIENT)
Dept: HEART AND VASCULAR | Facility: CLINIC | Age: 75
End: 2025-03-11
Payer: MEDICARE

## 2025-03-11 DIAGNOSIS — I34.0 NONRHEUMATIC MITRAL (VALVE) INSUFFICIENCY: ICD-10-CM

## 2025-03-11 PROCEDURE — 99213 OFFICE O/P EST LOW 20 MIN: CPT

## 2025-03-11 PROCEDURE — 93351 STRESS TTE COMPLETE: CPT

## 2025-03-13 ENCOUNTER — APPOINTMENT (OUTPATIENT)
Dept: GERIATRICS | Facility: CLINIC | Age: 75
End: 2025-03-13
Payer: MEDICARE

## 2025-03-13 ENCOUNTER — NON-APPOINTMENT (OUTPATIENT)
Age: 75
End: 2025-03-13

## 2025-03-13 VITALS — BODY MASS INDEX: 17.54 KG/M2 | WEIGHT: 103 LBS

## 2025-03-13 VITALS
OXYGEN SATURATION: 98 % | SYSTOLIC BLOOD PRESSURE: 105 MMHG | HEART RATE: 76 BPM | DIASTOLIC BLOOD PRESSURE: 67 MMHG | TEMPERATURE: 97.9 F

## 2025-03-13 DIAGNOSIS — R20.0 ANESTHESIA OF SKIN: ICD-10-CM

## 2025-03-13 DIAGNOSIS — M54.50 LOW BACK PAIN, UNSPECIFIED: ICD-10-CM

## 2025-03-13 DIAGNOSIS — E78.5 HYPERLIPIDEMIA, UNSPECIFIED: ICD-10-CM

## 2025-03-13 DIAGNOSIS — Z86.69 PERSONAL HISTORY OF OTHER DISEASES OF THE NERVOUS SYSTEM AND SENSE ORGANS: ICD-10-CM

## 2025-03-13 DIAGNOSIS — R94.39 ABNORMAL RESULT OF OTHER CARDIOVASCULAR FUNCTION STUDY: ICD-10-CM

## 2025-03-13 DIAGNOSIS — M85.80 OTHER SPECIFIED DISORDERS OF BONE DENSITY AND STRUCTURE, UNSPECIFIED SITE: ICD-10-CM

## 2025-03-13 DIAGNOSIS — R73.09 OTHER ABNORMAL GLUCOSE: ICD-10-CM

## 2025-03-13 PROCEDURE — 99214 OFFICE O/P EST MOD 30 MIN: CPT

## 2025-03-13 PROCEDURE — G2211 COMPLEX E/M VISIT ADD ON: CPT

## 2025-03-21 ENCOUNTER — RESULT REVIEW (OUTPATIENT)
Age: 75
End: 2025-03-21

## 2025-03-21 ENCOUNTER — APPOINTMENT (OUTPATIENT)
Dept: ORTHOPEDIC SURGERY | Facility: CLINIC | Age: 75
End: 2025-03-21
Payer: MEDICARE

## 2025-03-21 ENCOUNTER — OUTPATIENT (OUTPATIENT)
Dept: OUTPATIENT SERVICES | Facility: HOSPITAL | Age: 75
LOS: 1 days | End: 2025-03-21
Payer: MEDICARE

## 2025-03-21 VITALS
HEART RATE: 66 BPM | OXYGEN SATURATION: 96 % | SYSTOLIC BLOOD PRESSURE: 122 MMHG | DIASTOLIC BLOOD PRESSURE: 73 MMHG | BODY MASS INDEX: 19.14 KG/M2 | WEIGHT: 104 LBS | HEIGHT: 62 IN

## 2025-03-21 DIAGNOSIS — Z41.9 ENCOUNTER FOR PROCEDURE FOR PURPOSES OTHER THAN REMEDYING HEALTH STATE, UNSPECIFIED: Chronic | ICD-10-CM

## 2025-03-21 DIAGNOSIS — Z98.890 OTHER SPECIFIED POSTPROCEDURAL STATES: Chronic | ICD-10-CM

## 2025-03-21 DIAGNOSIS — Z47.1 AFTERCARE FOLLOWING JOINT REPLACEMENT SURGERY: ICD-10-CM

## 2025-03-21 DIAGNOSIS — M25.552 PAIN IN LEFT HIP: ICD-10-CM

## 2025-03-21 DIAGNOSIS — G89.29 PAIN IN LEFT HIP: ICD-10-CM

## 2025-03-21 DIAGNOSIS — M16.11 UNILATERAL PRIMARY OSTEOARTHRITIS, RIGHT HIP: ICD-10-CM

## 2025-03-21 DIAGNOSIS — M16.12 UNILATERAL PRIMARY OSTEOARTHRITIS, LEFT HIP: ICD-10-CM

## 2025-03-21 DIAGNOSIS — S73.192A OTHER SPRAIN OF LEFT HIP, INITIAL ENCOUNTER: ICD-10-CM

## 2025-03-21 DIAGNOSIS — Z96.641 AFTERCARE FOLLOWING JOINT REPLACEMENT SURGERY: ICD-10-CM

## 2025-03-21 PROCEDURE — 73521 X-RAY EXAM HIPS BI 2 VIEWS: CPT

## 2025-03-21 PROCEDURE — 73521 X-RAY EXAM HIPS BI 2 VIEWS: CPT | Mod: 26

## 2025-03-21 PROCEDURE — 99213 OFFICE O/P EST LOW 20 MIN: CPT

## 2025-04-21 ENCOUNTER — APPOINTMENT (OUTPATIENT)
Dept: CT IMAGING | Facility: HOSPITAL | Age: 75
End: 2025-04-21

## 2025-04-21 ENCOUNTER — OUTPATIENT (OUTPATIENT)
Dept: OUTPATIENT SERVICES | Facility: HOSPITAL | Age: 75
LOS: 1 days | End: 2025-04-21
Payer: MEDICARE

## 2025-04-21 DIAGNOSIS — Z98.890 OTHER SPECIFIED POSTPROCEDURAL STATES: Chronic | ICD-10-CM

## 2025-04-21 DIAGNOSIS — Z41.9 ENCOUNTER FOR PROCEDURE FOR PURPOSES OTHER THAN REMEDYING HEALTH STATE, UNSPECIFIED: Chronic | ICD-10-CM

## 2025-04-21 PROCEDURE — 75574 CT ANGIO HRT W/3D IMAGE: CPT

## 2025-04-21 PROCEDURE — 82565 ASSAY OF CREATININE: CPT

## 2025-04-21 PROCEDURE — 75574 CT ANGIO HRT W/3D IMAGE: CPT | Mod: 26

## 2025-04-22 ENCOUNTER — OUTPATIENT (OUTPATIENT)
Dept: OUTPATIENT SERVICES | Facility: HOSPITAL | Age: 75
LOS: 1 days | End: 2025-04-22

## 2025-04-22 ENCOUNTER — APPOINTMENT (OUTPATIENT)
Dept: MRI IMAGING | Facility: CLINIC | Age: 75
End: 2025-04-22
Payer: MEDICARE

## 2025-04-22 ENCOUNTER — RESULT REVIEW (OUTPATIENT)
Age: 75
End: 2025-04-22

## 2025-04-22 DIAGNOSIS — Z41.9 ENCOUNTER FOR PROCEDURE FOR PURPOSES OTHER THAN REMEDYING HEALTH STATE, UNSPECIFIED: Chronic | ICD-10-CM

## 2025-04-22 DIAGNOSIS — Z98.890 OTHER SPECIFIED POSTPROCEDURAL STATES: Chronic | ICD-10-CM

## 2025-04-22 PROCEDURE — 75565 CARD MRI VELOC FLOW MAPPING: CPT | Mod: 26

## 2025-04-22 PROCEDURE — 75561 CARDIAC MRI FOR MORPH W/DYE: CPT | Mod: 26

## 2025-04-25 ENCOUNTER — RESULT REVIEW (OUTPATIENT)
Age: 75
End: 2025-04-25

## 2025-04-25 ENCOUNTER — APPOINTMENT (OUTPATIENT)
Dept: HEART AND VASCULAR | Facility: CLINIC | Age: 75
End: 2025-04-25

## 2025-04-25 ENCOUNTER — OUTPATIENT (OUTPATIENT)
Dept: OUTPATIENT SERVICES | Facility: HOSPITAL | Age: 75
LOS: 1 days | End: 2025-04-25
Payer: MEDICARE

## 2025-04-25 DIAGNOSIS — Z41.9 ENCOUNTER FOR PROCEDURE FOR PURPOSES OTHER THAN REMEDYING HEALTH STATE, UNSPECIFIED: Chronic | ICD-10-CM

## 2025-04-25 DIAGNOSIS — I34.0 NONRHEUMATIC MITRAL (VALVE) INSUFFICIENCY: ICD-10-CM

## 2025-04-25 DIAGNOSIS — Z98.890 OTHER SPECIFIED POSTPROCEDURAL STATES: Chronic | ICD-10-CM

## 2025-04-25 PROCEDURE — 93306 TTE W/DOPPLER COMPLETE: CPT | Mod: 26

## 2025-04-28 ENCOUNTER — APPOINTMENT (OUTPATIENT)
Dept: CARDIOTHORACIC SURGERY | Facility: CLINIC | Age: 75
End: 2025-04-28
Payer: MEDICARE

## 2025-04-28 VITALS
TEMPERATURE: 97 F | DIASTOLIC BLOOD PRESSURE: 55 MMHG | HEIGHT: 64 IN | HEART RATE: 72 BPM | SYSTOLIC BLOOD PRESSURE: 110 MMHG | BODY MASS INDEX: 17.24 KG/M2 | OXYGEN SATURATION: 97 % | WEIGHT: 101 LBS

## 2025-04-28 PROCEDURE — 99204 OFFICE O/P NEW MOD 45 MIN: CPT

## 2025-04-28 PROCEDURE — 36415 COLL VENOUS BLD VENIPUNCTURE: CPT

## 2025-04-28 PROCEDURE — 83880 ASSAY OF NATRIURETIC PEPTIDE: CPT

## 2025-04-28 PROCEDURE — 93306 TTE W/DOPPLER COMPLETE: CPT

## 2025-07-24 ENCOUNTER — APPOINTMENT (OUTPATIENT)
Dept: GASTROENTEROLOGY | Facility: CLINIC | Age: 75
End: 2025-07-24
Payer: MEDICARE

## 2025-07-24 DIAGNOSIS — Z12.11 ENCOUNTER FOR SCREENING FOR MALIGNANT NEOPLASM OF COLON: ICD-10-CM

## 2025-07-24 PROCEDURE — 99203 OFFICE O/P NEW LOW 30 MIN: CPT | Mod: 93

## 2025-07-24 PROCEDURE — G2211 COMPLEX E/M VISIT ADD ON: CPT | Mod: 93

## 2025-07-25 RX ORDER — SODIUM SULFATE, MAGNESIUM SULFATE, AND POTASSIUM CHLORIDE 17.75; 2.7; 2.25 G/1; G/1; G/1
1479-225-188 TABLET ORAL
Qty: 1 | Refills: 0 | Status: ACTIVE | COMMUNITY
Start: 2025-07-25 | End: 1900-01-01

## 2025-07-29 NOTE — PRE-OP CHECKLIST - BP NONINVASIVE SYSTOLIC (MM HG)
1)  Stage 3 uterine prolapse:  --continue pessary: #3 ring + support   PESSARY CARE: Remove pessary as frequently as nightly and as infrequently as every 2-3 weeks.  Remove before intercourse.  May need to remove before bowel movements if straining dislodges.   Wash with soap and water (no ).  Replace using small amount of water-based lubricant (like KY jelly) at end being introduced into vagina.    --having more trouble removing/replacing independently              --RTC Q3-4 months for pessary checks for now     --if tires of pessary use and is done with working (can't take off time for surgery right now):  --surgical option: vaginal hysterectomy, uterosacral suspension, anterior/posterior repair  --would need ultrasound/bladder testing beforehand  --would need plan from PCP or cards to bridge off/back on xarelto and if needs injections while off (usually give heparin 5000 U SQ preop, then Q8h periop)  --would need clearance per PCP (Min) + labs (CBC, CMP, T&S)/EKG     2)  Vaginal atrophy (dryness):     --use Vaginal estrogen:  --Use 0.5 grams of estrogen cream in vagina with applicator or dime-sized amount with finger (as far as can reach internally) twice a week.  You can also apply a dime-sized amount with your finger around the vaginal opening and inner lips at same frequency.               --try to get finger above or beneath pessary ring                          --Vaginal estrogen may help to decrease pain related to dryness with intercourse and urinary symptoms (urgency/frequency/UTIs) around menopause.                 3)  Urinary urgency/post-void dribbling:  --continue pessary   --Empty bladder every 3 hours.  Empty well: wait a minute, lean forward on toilet.    --Avoid dietary irritants (see sheet).  Keep diary x 3-5 days to determine your irritants.  --KEGELS: do 10 in AM and 10 in PM, holding each x 10 seconds.  When you feel urge to go, STOP, KEGEL, and when urge has passed, then go to  bathroom.  Consider PT in future.    --URGE: consider medication in future.  Takes 2-4 weeks to see if will have effect.  For dry mouth: get sour, sugar free lozenge or gum.       4) Elevated PVR:  --PVR improved with pessary and on  previous recheck     5)  Fecal smearing:  --hydrate well  --avoid dietary triggers     6)  RTC 3 months for pc   130

## 2025-08-07 ENCOUNTER — APPOINTMENT (OUTPATIENT)
Dept: GERIATRICS | Facility: CLINIC | Age: 75
End: 2025-08-07
Payer: MEDICARE

## 2025-08-07 ENCOUNTER — LABORATORY RESULT (OUTPATIENT)
Age: 75
End: 2025-08-07

## 2025-08-07 VITALS
SYSTOLIC BLOOD PRESSURE: 116 MMHG | HEIGHT: 64 IN | TEMPERATURE: 97.4 F | BODY MASS INDEX: 16.22 KG/M2 | DIASTOLIC BLOOD PRESSURE: 73 MMHG | HEART RATE: 72 BPM | OXYGEN SATURATION: 97 % | WEIGHT: 95 LBS

## 2025-08-07 DIAGNOSIS — M85.80 OTHER SPECIFIED DISORDERS OF BONE DENSITY AND STRUCTURE, UNSPECIFIED SITE: ICD-10-CM

## 2025-08-07 DIAGNOSIS — Z23 ENCOUNTER FOR IMMUNIZATION: ICD-10-CM
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
## 2025-08-07 DIAGNOSIS — K59.00 CONSTIPATION, UNSPECIFIED: ICD-10-CM

## 2025-08-07 DIAGNOSIS — E78.5 HYPERLIPIDEMIA, UNSPECIFIED: ICD-10-CM

## 2025-08-07 DIAGNOSIS — M54.50 LOW BACK PAIN, UNSPECIFIED: ICD-10-CM

## 2025-08-07 DIAGNOSIS — M16.12 UNILATERAL PRIMARY OSTEOARTHRITIS, LEFT HIP: ICD-10-CM

## 2025-08-07 DIAGNOSIS — R73.03 PREDIABETES.: ICD-10-CM

## 2025-08-07 DIAGNOSIS — Z87.898 PERSONAL HISTORY OF OTHER SPECIFIED CONDITIONS: ICD-10-CM

## 2025-08-07 DIAGNOSIS — R07.89 OTHER CHEST PAIN: ICD-10-CM

## 2025-08-07 PROCEDURE — 36415 COLL VENOUS BLD VENIPUNCTURE: CPT

## 2025-08-07 PROCEDURE — 99215 OFFICE O/P EST HI 40 MIN: CPT

## 2025-08-08 LAB
25(OH)D3 SERPL-MCNC: 56.6 NG/ML
ALBUMIN SERPL ELPH-MCNC: 4.8 G/DL
ALP BLD-CCNC: 66 U/L
ALT SERPL-CCNC: 22 U/L
ANION GAP SERPL CALC-SCNC: 14 MMOL/L
AST SERPL-CCNC: 34 U/L
BILIRUB SERPL-MCNC: 0.5 MG/DL
BUN SERPL-MCNC: 20 MG/DL
CALCIUM SERPL-MCNC: 10.2 MG/DL
CHLORIDE SERPL-SCNC: 103 MMOL/L
CHOLEST SERPL-MCNC: 171 MG/DL
CO2 SERPL-SCNC: 26 MMOL/L
CREAT SERPL-MCNC: 0.74 MG/DL
EGFRCR SERPLBLD CKD-EPI 2021: 84 ML/MIN/1.73M2
ESTIMATED AVERAGE GLUCOSE: 126 MG/DL
GLUCOSE SERPL-MCNC: 101 MG/DL
HBA1C MFR BLD HPLC: 6 %
HCT VFR BLD CALC: 41.4 %
HDLC SERPL-MCNC: 97 MG/DL
HGB BLD-MCNC: 13.2 G/DL
LDLC SERPL-MCNC: 64 MG/DL
MCHC RBC-ENTMCNC: 31.9 G/DL
MCHC RBC-ENTMCNC: 32.6 PG
MCV RBC AUTO: 102.2 FL
NONHDLC SERPL-MCNC: 74 MG/DL
PLATELET # BLD AUTO: 179 K/UL
POTASSIUM SERPL-SCNC: 5 MMOL/L
PROT SERPL-MCNC: 7.1 G/DL
RBC # BLD: 4.05 M/UL
RBC # FLD: 14.4 %
SODIUM SERPL-SCNC: 143 MMOL/L
TRIGL SERPL-MCNC: 45 MG/DL
TSH SERPL-ACNC: 2.38 UIU/ML
WBC # FLD AUTO: 5.46 K/UL

## 2025-08-12 ENCOUNTER — RX RENEWAL (OUTPATIENT)
Age: 75
End: 2025-08-12

## 2025-08-26 DIAGNOSIS — M19.90 UNSPECIFIED OSTEOARTHRITIS, UNSPECIFIED SITE: ICD-10-CM

## 2025-08-26 DIAGNOSIS — M25.559 PAIN IN UNSPECIFIED HIP: ICD-10-CM

## 2025-08-27 ENCOUNTER — APPOINTMENT (OUTPATIENT)
Dept: HEART AND VASCULAR | Facility: CLINIC | Age: 75
End: 2025-08-27
Payer: MEDICARE

## 2025-08-27 ENCOUNTER — NON-APPOINTMENT (OUTPATIENT)
Age: 75
End: 2025-08-27

## 2025-08-27 VITALS
SYSTOLIC BLOOD PRESSURE: 128 MMHG | HEART RATE: 56 BPM | TEMPERATURE: 97.5 F | BODY MASS INDEX: 16.9 KG/M2 | HEIGHT: 64 IN | WEIGHT: 98.99 LBS | DIASTOLIC BLOOD PRESSURE: 64 MMHG | OXYGEN SATURATION: 96 %

## 2025-08-27 DIAGNOSIS — R00.2 PALPITATIONS: ICD-10-CM

## 2025-08-27 PROCEDURE — 93000 ELECTROCARDIOGRAM COMPLETE: CPT

## 2025-08-27 PROCEDURE — G2211 COMPLEX E/M VISIT ADD ON: CPT

## 2025-08-27 PROCEDURE — 99214 OFFICE O/P EST MOD 30 MIN: CPT

## 2025-09-09 ENCOUNTER — NON-APPOINTMENT (OUTPATIENT)
Age: 75
End: 2025-09-09

## 2025-09-18 ENCOUNTER — APPOINTMENT (OUTPATIENT)
Dept: GASTROENTEROLOGY | Facility: CLINIC | Age: 75
End: 2025-09-18

## 2025-09-18 ENCOUNTER — RESULT REVIEW (OUTPATIENT)
Age: 75
End: 2025-09-18

## 2025-09-23 PROBLEM — Z23 ENCOUNTER FOR IMMUNIZATION: Status: ACTIVE | Noted: 2025-08-07 | Resolved: 2025-10-07
